# Patient Record
Sex: MALE | Race: WHITE | NOT HISPANIC OR LATINO | Employment: FULL TIME | URBAN - METROPOLITAN AREA
[De-identification: names, ages, dates, MRNs, and addresses within clinical notes are randomized per-mention and may not be internally consistent; named-entity substitution may affect disease eponyms.]

---

## 2023-02-09 PROBLEM — M54.2 NECK PAIN: Status: ACTIVE | Noted: 2023-02-09
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
## 2023-02-09 PROBLEM — D23.9 DYSPLASTIC NEVI: Status: ACTIVE | Noted: 2023-02-09

## 2023-02-09 PROBLEM — R53.83 FATIGUE: Status: ACTIVE | Noted: 2023-02-09

## 2023-02-09 PROBLEM — R41.840 DIFFICULTY CONCENTRATING: Status: ACTIVE | Noted: 2023-02-09

## 2023-02-09 RX ORDER — TIZANIDINE 4 MG/1
1 TABLET ORAL NIGHTLY
COMMUNITY
Start: 2022-10-25 | End: 2023-04-04 | Stop reason: SDUPTHER

## 2023-02-09 RX ORDER — TADALAFIL 10 MG/1
1 TABLET ORAL
COMMUNITY
Start: 2021-12-15 | End: 2023-04-04 | Stop reason: SDUPTHER

## 2023-02-09 RX ORDER — LISDEXAMFETAMINE DIMESYLATE 40 MG/1
1 CAPSULE ORAL DAILY
COMMUNITY
End: 2023-03-09 | Stop reason: SDUPTHER

## 2023-03-09 DIAGNOSIS — R41.840 DIFFICULTY CONCENTRATING: Primary | ICD-10-CM

## 2023-03-09 RX ORDER — LISDEXAMFETAMINE DIMESYLATE 40 MG/1
40 CAPSULE ORAL DAILY
Qty: 30 CAPSULE | Refills: 0 | Status: SHIPPED | OUTPATIENT
Start: 2023-03-09 | End: 2023-04-04 | Stop reason: SDUPTHER

## 2023-03-09 NOTE — TELEPHONE ENCOUNTER
Requested Prescriptions     Pending Prescriptions Disp Refills    lisdexamfetamine (Vyvanse) 40 mg capsule 30 capsule 0     Sig: Take 1 capsule (40 mg) by mouth once daily.

## 2023-04-04 ENCOUNTER — OFFICE VISIT (OUTPATIENT)
Dept: PRIMARY CARE | Facility: CLINIC | Age: 53
End: 2023-04-04
Payer: COMMERCIAL

## 2023-04-04 VITALS
WEIGHT: 205 LBS | TEMPERATURE: 97.2 F | DIASTOLIC BLOOD PRESSURE: 96 MMHG | HEART RATE: 88 BPM | RESPIRATION RATE: 18 BRPM | SYSTOLIC BLOOD PRESSURE: 140 MMHG

## 2023-04-04 DIAGNOSIS — F98.8 ATTENTION DEFICIT DISORDER, UNSPECIFIED HYPERACTIVITY PRESENCE: Primary | ICD-10-CM

## 2023-04-04 DIAGNOSIS — R41.840 DIFFICULTY CONCENTRATING: ICD-10-CM

## 2023-04-04 DIAGNOSIS — Z00.00 WELL ADULT EXAM: ICD-10-CM

## 2023-04-04 DIAGNOSIS — M54.50 CHRONIC BILATERAL LOW BACK PAIN WITHOUT SCIATICA: ICD-10-CM

## 2023-04-04 DIAGNOSIS — G89.29 CHRONIC BILATERAL LOW BACK PAIN WITHOUT SCIATICA: ICD-10-CM

## 2023-04-04 LAB
AMPHETAMINE (PRESENCE) IN URINE BY SCREEN METHOD: ABNORMAL
BARBITURATES PRESENCE IN URINE BY SCREEN METHOD: ABNORMAL
BENZODIAZEPINE (PRESENCE) IN URINE BY SCREEN METHOD: ABNORMAL
CANNABINOIDS IN URINE BY SCREEN METHOD: ABNORMAL
COCAINE (PRESENCE) IN URINE BY SCREEN METHOD: ABNORMAL
DRUG SCREEN COMMENT URINE: ABNORMAL
FENTANYL URINE: ABNORMAL
METHADONE (PRESENCE) IN URINE BY SCREEN METHOD: ABNORMAL
OPIATES (PRESENCE) IN URINE BY SCREEN METHOD: ABNORMAL
OXYCODONE (PRESENCE) IN URINE BY SCREEN METHOD: ABNORMAL
PHENCYCLIDINE (PRESENCE) IN URINE BY SCREEN METHOD: ABNORMAL

## 2023-04-04 PROCEDURE — 80307 DRUG TEST PRSMV CHEM ANLYZR: CPT

## 2023-04-04 PROCEDURE — 36415 COLL VENOUS BLD VENIPUNCTURE: CPT

## 2023-04-04 PROCEDURE — 80324 DRUG SCREEN AMPHETAMINES 1/2: CPT

## 2023-04-04 PROCEDURE — 99213 OFFICE O/P EST LOW 20 MIN: CPT | Performed by: FAMILY MEDICINE

## 2023-04-04 RX ORDER — TADALAFIL 10 MG/1
10 TABLET ORAL DAILY
Qty: 10 TABLET | Refills: 1 | Status: SHIPPED | OUTPATIENT
Start: 2023-04-04 | End: 2023-06-19 | Stop reason: SDUPTHER

## 2023-04-04 RX ORDER — LISDEXAMFETAMINE DIMESYLATE 40 MG/1
40 CAPSULE ORAL DAILY
Qty: 30 CAPSULE | Refills: 0 | Status: SHIPPED | OUTPATIENT
Start: 2023-04-04 | End: 2023-05-09 | Stop reason: SDUPTHER

## 2023-04-04 RX ORDER — TIZANIDINE 4 MG/1
4 TABLET ORAL NIGHTLY
Qty: 90 TABLET | Refills: 1 | Status: SHIPPED | OUTPATIENT
Start: 2023-04-04 | End: 2023-06-19 | Stop reason: ALTCHOICE

## 2023-04-04 ASSESSMENT — ENCOUNTER SYMPTOMS
CONSTITUTIONAL NEGATIVE: 1
SHORTNESS OF BREATH: 0
NERVOUS/ANXIOUS: 0
HYPERACTIVE: 0
DECREASED CONCENTRATION: 1
FATIGUE: 0
CHEST TIGHTNESS: 0
APPETITE CHANGE: 0
AGITATION: 0
HEADACHES: 0
TREMORS: 0

## 2023-04-04 NOTE — PROGRESS NOTES
OARRS:  No data recorded  I have personally reviewed the OARRS report for Josue Quintanilla. I have considered the risks of abuse, dependence, addiction and diversion    Is the patient prescribed a combination of a benzodiazepine and opioid?  No    Last Urine Drug Screen / ordered today: No  Recent Results (from the past 68634 hour(s))   Amphetamine Confirm, Urine    Collection Time: 03/18/22  7:34 AM   Result Value Ref Range    Amphetamines,Urine 1,197 ng/mL    MDA, Urine <200 ng/mL    MDEA, Urine <200 ng/mL    MDMA, Urine <200 ng/mL    Methamphetamine Quant, Ur <200 ng/mL    Phentermine,Urine <200 ng/mL   Drug Screen, Urine With Reflex to Confirmation    Collection Time: 03/18/22  7:34 AM   Result Value Ref Range    DRUG SCREEN COMMENT URINE SEE BELOW     Amphetamine Screen, Urine PRESUMPTIVE POSITIVE (A) NEGATIVE    Barbiturate Screen, Urine PRESUMPTIVE NEGATIVE NEGATIVE    BENZODIAZEPINE (PRESENCE) IN URINE BY SCREEN METHOD PRESUMPTIVE NEGATIVE NEGATIVE    Cannabinoid Screen, Urine PRESUMPTIVE NEGATIVE NEGATIVE    Cocaine Screen, Urine PRESUMPTIVE NEGATIVE NEGATIVE    Fentanyl, Ur PRESUMPTIVE NEGATIVE NEGATIVE    Methadone Screen, Urine PRESUMPTIVE NEGATIVE NEGATIVE    Opiate Screen, Urine PRESUMPTIVE NEGATIVE NEGATIVE    Oxycodone Screen, Ur PRESUMPTIVE NEGATIVE NEGATIVE    PCP Screen, Urine PRESUMPTIVE NEGATIVE NEGATIVE     Results are as expected.     Controlled Substance Agreement:  Date of the Last Agreement: 4/4/23  Reviewed Controlled Substance Agreement including but not limited to the benefits, risks, and alternatives to treatment with a Controlled Substance medication(s).    Stimulants:   What is the patient's goal of therapy? HELP WITH CONCENTRATION  Is this being achieved with current treatment? YES    Activities of Daily Living:   Is your overall impression that this patient is benefiting (symptom reduction outweighs side effects) from stimulant therapy? Yes     1. Physical Functioning: Better  2. Family  Relationship: Better  3. Social Relationship: Better  4. Mood: Better  5. Sleep Patterns: Better  6. Overall Function: Better  Subjective   Patient ID: Josue Quintanilla is a 52 y.o. male who presents for Med Refill (3 month med check.  ).  HPI    Review of Systems   Constitutional: Negative.  Negative for appetite change and fatigue.   Respiratory:  Negative for chest tightness and shortness of breath.    Neurological:  Negative for tremors and headaches.   Psychiatric/Behavioral:  Positive for decreased concentration. Negative for agitation. The patient is not nervous/anxious and is not hyperactive.         No Insomnia       Objective   Physical Exam  Constitutional:       Appearance: Normal appearance.   HENT:      Head: Normocephalic and atraumatic.   Cardiovascular:      Rate and Rhythm: Normal rate and regular rhythm.   Pulmonary:      Effort: Pulmonary effort is normal.   Skin:     General: Skin is warm and dry.   Neurological:      Mental Status: He is alert and oriented to person, place, and time.   Psychiatric:         Attention and Perception: He is inattentive.         Mood and Affect: Mood normal.         Speech: Speech normal.         Behavior: Behavior normal. Behavior is cooperative.         Cognition and Memory: Cognition normal.         Assessment/Plan   Problem List Items Addressed This Visit          Other    Difficulty concentrating    Relevant Medications    lisdexamfetamine (Vyvanse) 40 mg capsule     Other Visit Diagnoses       Attention deficit disorder, unspecified hyperactivity presence    -  Primary    Relevant Orders    Drug Screen, Urine With Reflex to Confirmation    Amphetamine Confirm, Urine    Chronic bilateral low back pain without sciatica        Relevant Medications    tiZANidine (Zanaflex) 4 mg tablet    Well adult exam        Relevant Medications    tadalafil (Cialis) 10 mg tablet

## 2023-04-08 LAB
AMPHETAMINES,URINE: 3878 NG/ML
MDA,URINE: <200 NG/ML
MDEA,URINE: <200 NG/ML
MDMA,UR: <200 NG/ML
METHAMPHETAMINE QUANTITATIVE URINE: <200 NG/ML
PHENTERMINE,UR: <200 NG/ML

## 2023-05-09 ENCOUNTER — TELEPHONE (OUTPATIENT)
Dept: PEDIATRICS | Facility: CLINIC | Age: 53
End: 2023-05-09
Payer: COMMERCIAL

## 2023-05-09 DIAGNOSIS — R41.840 DIFFICULTY CONCENTRATING: ICD-10-CM

## 2023-05-09 RX ORDER — LISDEXAMFETAMINE DIMESYLATE 40 MG/1
40 CAPSULE ORAL DAILY
Qty: 30 CAPSULE | Refills: 0 | Status: SHIPPED | OUTPATIENT
Start: 2023-05-09 | End: 2023-06-07 | Stop reason: SDUPTHER

## 2023-06-06 ENCOUNTER — TELEPHONE (OUTPATIENT)
Dept: PRIMARY CARE | Facility: CLINIC | Age: 53
End: 2023-06-06
Payer: COMMERCIAL

## 2023-06-07 DIAGNOSIS — R41.840 DIFFICULTY CONCENTRATING: ICD-10-CM

## 2023-06-07 RX ORDER — LISDEXAMFETAMINE DIMESYLATE 40 MG/1
40 CAPSULE ORAL DAILY
Qty: 30 CAPSULE | Refills: 0 | Status: SHIPPED | OUTPATIENT
Start: 2023-06-07 | End: 2023-06-26 | Stop reason: SDUPTHER

## 2023-06-19 ENCOUNTER — OFFICE VISIT (OUTPATIENT)
Dept: PRIMARY CARE | Facility: CLINIC | Age: 53
End: 2023-06-19
Payer: COMMERCIAL

## 2023-06-19 VITALS
DIASTOLIC BLOOD PRESSURE: 92 MMHG | SYSTOLIC BLOOD PRESSURE: 136 MMHG | RESPIRATION RATE: 17 BRPM | HEIGHT: 72 IN | BODY MASS INDEX: 27.22 KG/M2 | TEMPERATURE: 97.4 F | HEART RATE: 92 BPM | WEIGHT: 201 LBS

## 2023-06-19 DIAGNOSIS — R41.840 DIFFICULTY CONCENTRATING: Primary | ICD-10-CM

## 2023-06-19 DIAGNOSIS — Z00.00 WELL ADULT EXAM: ICD-10-CM

## 2023-06-19 PROCEDURE — 99213 OFFICE O/P EST LOW 20 MIN: CPT | Performed by: FAMILY MEDICINE

## 2023-06-19 RX ORDER — TADALAFIL 10 MG/1
10 TABLET ORAL DAILY
Qty: 10 TABLET | Refills: 1 | Status: SHIPPED | OUTPATIENT
Start: 2023-06-19 | End: 2023-06-19 | Stop reason: SDUPTHER

## 2023-06-19 RX ORDER — TADALAFIL 10 MG/1
10 TABLET ORAL DAILY
Qty: 10 TABLET | Refills: 3 | Status: SHIPPED | OUTPATIENT
Start: 2023-06-19 | End: 2024-01-24 | Stop reason: SDUPTHER

## 2023-06-19 ASSESSMENT — ENCOUNTER SYMPTOMS
HYPERACTIVE: 0
NERVOUS/ANXIOUS: 0
DECREASED CONCENTRATION: 1
FATIGUE: 0
HEADACHES: 0
APPETITE CHANGE: 0
CONSTITUTIONAL NEGATIVE: 1
SHORTNESS OF BREATH: 0
TREMORS: 0
CHEST TIGHTNESS: 0
AGITATION: 0

## 2023-06-19 ASSESSMENT — PATIENT HEALTH QUESTIONNAIRE - PHQ9
1. LITTLE INTEREST OR PLEASURE IN DOING THINGS: NOT AT ALL
2. FEELING DOWN, DEPRESSED OR HOPELESS: NOT AT ALL
SUM OF ALL RESPONSES TO PHQ9 QUESTIONS 1 AND 2: 0

## 2023-06-19 NOTE — PROGRESS NOTES
OARRS:  No data recorded  I have personally reviewed the OARRS report for Josue Quintanilla. I have considered the risks of abuse, dependence, addiction and diversion    Is the patient prescribed a combination of a benzodiazepine and opioid?  No    Last Urine Drug Screen / ordered today: No  Recent Results (from the past 72735 hour(s))   Amphetamine Confirm, Urine    Collection Time: 04/04/23  2:24 PM   Result Value Ref Range    Methamphetamine Quant, Ur <200 ng/mL    MDA, Urine <200 ng/mL    MDEA, Urine <200 ng/mL    Phentermine,Urine <200 ng/mL    Amphetamines,Urine 3878 ng/mL    MDMA, Urine <200 ng/mL   Drug Screen, Urine With Reflex to Confirmation    Collection Time: 04/04/23  2:24 PM   Result Value Ref Range    DRUG SCREEN COMMENT URINE SEE BELOW     Amphetamine Screen, Urine PRESUMPTIVE POSITIVE (A) NEGATIVE    Barbiturate Screen, Urine PRESUMPTIVE NEGATIVE NEGATIVE    BENZODIAZEPINE (PRESENCE) IN URINE BY SCREEN METHOD PRESUMPTIVE NEGATIVE NEGATIVE    Cannabinoid Screen, Urine PRESUMPTIVE NEGATIVE NEGATIVE    Cocaine Screen, Urine PRESUMPTIVE NEGATIVE NEGATIVE    Fentanyl, Ur PRESUMPTIVE NEGATIVE NEGATIVE    Methadone Screen, Urine PRESUMPTIVE NEGATIVE NEGATIVE    Opiate Screen, Urine PRESUMPTIVE NEGATIVE NEGATIVE    Oxycodone Screen, Ur PRESUMPTIVE NEGATIVE NEGATIVE    PCP Screen, Urine PRESUMPTIVE NEGATIVE NEGATIVE     Results are as expected.     Controlled Substance Agreement:  Date of the Last Agreement: 4/4/23  Reviewed Controlled Substance Agreement including but not limited to the benefits, risks, and alternatives to treatment with a Controlled Substance medication(s).    Stimulants:   What is the patient's goal of therapy? Help with concedntration  Is this being achieved with current treatment? yes    Activities of Daily Living:   Is your overall impression that this patient is benefiting (symptom reduction outweighs side effects) from stimulant therapy? Yes     1. Physical Functioning: Better  2. Family  Relationship: Better  3. Social Relationship: Better  4. Mood: Better  5. Sleep Patterns: Better  6. Overall Function: Better  Subjective   Patient ID: Josue Quintanilla is a 53 y.o. male who presents for Med Refill (3 month med check. Pt states he has been doing well and has no new concerns today. ).  HPI    Review of Systems   Constitutional: Negative.  Negative for appetite change and fatigue.   Respiratory:  Negative for chest tightness and shortness of breath.    Neurological:  Negative for tremors and headaches.   Psychiatric/Behavioral:  Positive for decreased concentration. Negative for agitation. The patient is not nervous/anxious and is not hyperactive.         No Insomnia       Objective   Physical Exam  Constitutional:       Appearance: Normal appearance.   HENT:      Head: Normocephalic and atraumatic.   Cardiovascular:      Rate and Rhythm: Normal rate and regular rhythm.   Pulmonary:      Effort: Pulmonary effort is normal.   Skin:     General: Skin is warm and dry.   Neurological:      Mental Status: He is alert and oriented to person, place, and time.   Psychiatric:         Attention and Perception: He is inattentive.         Mood and Affect: Mood normal.         Speech: Speech normal.         Behavior: Behavior normal. Behavior is cooperative.         Cognition and Memory: Cognition normal.         Assessment/Plan   Problem List Items Addressed This Visit       Difficulty concentrating - Primary     Other Visit Diagnoses       Well adult exam        Relevant Medications    tadalafil (Cialis) 10 mg tablet

## 2023-06-26 DIAGNOSIS — R41.840 DIFFICULTY CONCENTRATING: ICD-10-CM

## 2023-06-26 RX ORDER — LISDEXAMFETAMINE DIMESYLATE 40 MG/1
40 CAPSULE ORAL DAILY
Qty: 30 CAPSULE | Refills: 0 | Status: SHIPPED | OUTPATIENT
Start: 2023-06-26 | End: 2023-08-02 | Stop reason: SDUPTHER

## 2023-07-03 ENCOUNTER — TELEPHONE (OUTPATIENT)
Dept: PEDIATRICS | Facility: CLINIC | Age: 53
End: 2023-07-03
Payer: COMMERCIAL

## 2023-08-02 DIAGNOSIS — R41.840 DIFFICULTY CONCENTRATING: ICD-10-CM

## 2023-08-02 RX ORDER — LISDEXAMFETAMINE DIMESYLATE 40 MG/1
40 CAPSULE ORAL DAILY
Qty: 30 CAPSULE | Refills: 0 | Status: SHIPPED | OUTPATIENT
Start: 2023-08-02 | End: 2023-08-29 | Stop reason: SDUPTHER

## 2023-08-29 ENCOUNTER — TELEPHONE (OUTPATIENT)
Dept: PRIMARY CARE | Facility: CLINIC | Age: 53
End: 2023-08-29
Payer: COMMERCIAL

## 2023-08-29 DIAGNOSIS — R41.840 DIFFICULTY CONCENTRATING: ICD-10-CM

## 2023-08-29 RX ORDER — LISDEXAMFETAMINE DIMESYLATE 40 MG/1
40 CAPSULE ORAL DAILY
Qty: 30 CAPSULE | Refills: 0 | Status: SHIPPED | OUTPATIENT
Start: 2023-08-29 | End: 2023-10-06 | Stop reason: SDUPTHER

## 2023-08-29 NOTE — TELEPHONE ENCOUNTER
Patient requests prescription below    Last Office Visit: 6/19/2023     Requested Prescriptions     Pending Prescriptions Disp Refills    lisdexamfetamine (Vyvanse) 40 mg capsule 30 capsule 0     Sig: Take 1 capsule (40 mg) by mouth once daily.

## 2023-10-06 DIAGNOSIS — R41.840 DIFFICULTY CONCENTRATING: ICD-10-CM

## 2023-10-06 RX ORDER — LISDEXAMFETAMINE DIMESYLATE 40 MG/1
40 CAPSULE ORAL DAILY
Qty: 30 CAPSULE | Refills: 0 | Status: SHIPPED | OUTPATIENT
Start: 2023-10-06 | End: 2023-11-01 | Stop reason: SDUPTHER

## 2023-10-06 NOTE — TELEPHONE ENCOUNTER
Patient requests prescription below    Last Office Visit: 9/13/2023     Requested Prescriptions     Pending Prescriptions Disp Refills    lisdexamfetamine (Vyvanse) 40 mg capsule 30 capsule 0     Sig: Take 1 capsule (40 mg) by mouth once daily.

## 2023-11-01 DIAGNOSIS — R41.840 DIFFICULTY CONCENTRATING: ICD-10-CM

## 2023-11-01 RX ORDER — LISDEXAMFETAMINE DIMESYLATE 40 MG/1
40 CAPSULE ORAL DAILY
Qty: 90 CAPSULE | Refills: 0 | Status: SHIPPED | OUTPATIENT
Start: 2023-11-01 | End: 2024-01-24 | Stop reason: SDUPTHER

## 2023-11-01 NOTE — TELEPHONE ENCOUNTER
Patient requests prescription below    Last Office Visit: 9/13/2023     Requested Prescriptions     Pending Prescriptions Disp Refills    lisdexamfetamine (Vyvanse) 40 mg capsule 90 capsule 0     Sig: Take 1 capsule (40 mg) by mouth once daily.

## 2023-11-10 ENCOUNTER — LAB (OUTPATIENT)
Dept: LAB | Facility: LAB | Age: 53
End: 2023-11-10
Payer: COMMERCIAL

## 2023-11-10 DIAGNOSIS — L03.90 CELLULITIS, UNSPECIFIED CELLULITIS SITE: Primary | ICD-10-CM

## 2023-11-10 DIAGNOSIS — L03.90 CELLULITIS, UNSPECIFIED CELLULITIS SITE: ICD-10-CM

## 2023-11-10 LAB
ALBUMIN SERPL BCP-MCNC: 4.5 G/DL (ref 3.4–5)
ALP SERPL-CCNC: 65 U/L (ref 33–120)
ALT SERPL W P-5'-P-CCNC: 17 U/L (ref 10–52)
ANION GAP SERPL CALC-SCNC: 12 MMOL/L (ref 10–20)
AST SERPL W P-5'-P-CCNC: 20 U/L (ref 9–39)
BASOPHILS # BLD AUTO: 0.04 X10*3/UL (ref 0–0.1)
BASOPHILS NFR BLD AUTO: 0.6 %
BILIRUB SERPL-MCNC: 0.8 MG/DL (ref 0–1.2)
BUN SERPL-MCNC: 13 MG/DL (ref 6–23)
CALCIUM SERPL-MCNC: 9.6 MG/DL (ref 8.6–10.3)
CHLORIDE SERPL-SCNC: 100 MMOL/L (ref 98–107)
CO2 SERPL-SCNC: 30 MMOL/L (ref 21–32)
CREAT SERPL-MCNC: 1.04 MG/DL (ref 0.5–1.3)
EOSINOPHIL # BLD AUTO: 0.03 X10*3/UL (ref 0–0.7)
EOSINOPHIL NFR BLD AUTO: 0.4 %
ERYTHROCYTE [DISTWIDTH] IN BLOOD BY AUTOMATED COUNT: 12.3 % (ref 11.5–14.5)
GFR SERPL CREATININE-BSD FRML MDRD: 87 ML/MIN/1.73M*2
GLUCOSE SERPL-MCNC: 95 MG/DL (ref 74–99)
HCT VFR BLD AUTO: 40 % (ref 41–52)
HGB BLD-MCNC: 13.8 G/DL (ref 13.5–17.5)
IMM GRANULOCYTES # BLD AUTO: 0.05 X10*3/UL (ref 0–0.7)
IMM GRANULOCYTES NFR BLD AUTO: 0.7 % (ref 0–0.9)
LYMPHOCYTES # BLD AUTO: 2.08 X10*3/UL (ref 1.2–4.8)
LYMPHOCYTES NFR BLD AUTO: 29.3 %
MCH RBC QN AUTO: 29.1 PG (ref 26–34)
MCHC RBC AUTO-ENTMCNC: 34.5 G/DL (ref 32–36)
MCV RBC AUTO: 84 FL (ref 80–100)
MONOCYTES # BLD AUTO: 0.42 X10*3/UL (ref 0.1–1)
MONOCYTES NFR BLD AUTO: 5.9 %
NEUTROPHILS # BLD AUTO: 4.47 X10*3/UL (ref 1.2–7.7)
NEUTROPHILS NFR BLD AUTO: 63.1 %
NRBC BLD-RTO: 0 /100 WBCS (ref 0–0)
PLATELET # BLD AUTO: 251 X10*3/UL (ref 150–450)
POTASSIUM SERPL-SCNC: 4.4 MMOL/L (ref 3.5–5.3)
PROT SERPL-MCNC: 7.5 G/DL (ref 6.4–8.2)
RBC # BLD AUTO: 4.75 X10*6/UL (ref 4.5–5.9)
SODIUM SERPL-SCNC: 138 MMOL/L (ref 136–145)
WBC # BLD AUTO: 7.1 X10*3/UL (ref 4.4–11.3)

## 2023-11-10 PROCEDURE — 86618 LYME DISEASE ANTIBODY: CPT

## 2023-11-10 PROCEDURE — 36415 COLL VENOUS BLD VENIPUNCTURE: CPT

## 2023-11-10 PROCEDURE — 85025 COMPLETE CBC W/AUTO DIFF WBC: CPT

## 2023-11-10 PROCEDURE — 80053 COMPREHEN METABOLIC PANEL: CPT

## 2023-11-10 RX ORDER — DOXYCYCLINE 100 MG/1
100 CAPSULE ORAL 2 TIMES DAILY
Qty: 42 CAPSULE | Refills: 0 | Status: SHIPPED | OUTPATIENT
Start: 2023-11-10 | End: 2023-12-01

## 2023-11-10 RX ORDER — DOXYCYCLINE 100 MG/1
100 CAPSULE ORAL 2 TIMES DAILY
Qty: 42 CAPSULE | Refills: 0 | Status: SHIPPED | OUTPATIENT
Start: 2023-11-10 | End: 2023-11-10 | Stop reason: SDUPTHER

## 2023-11-12 LAB — B BURGDOR.VLSE1+PEPC10 AB SER IA-ACNC: 0.65 IV

## 2024-01-24 ENCOUNTER — OFFICE VISIT (OUTPATIENT)
Dept: PRIMARY CARE | Facility: CLINIC | Age: 54
End: 2024-01-24
Payer: COMMERCIAL

## 2024-01-24 VITALS
SYSTOLIC BLOOD PRESSURE: 140 MMHG | DIASTOLIC BLOOD PRESSURE: 90 MMHG | HEART RATE: 82 BPM | TEMPERATURE: 97.4 F | WEIGHT: 209 LBS | BODY MASS INDEX: 28.35 KG/M2 | RESPIRATION RATE: 17 BRPM

## 2024-01-24 DIAGNOSIS — R41.840 DIFFICULTY CONCENTRATING: ICD-10-CM

## 2024-01-24 DIAGNOSIS — J06.9 UPPER RESPIRATORY TRACT INFECTION, UNSPECIFIED TYPE: Primary | ICD-10-CM

## 2024-01-24 DIAGNOSIS — Z00.00 WELL ADULT EXAM: ICD-10-CM

## 2024-01-24 PROCEDURE — 99213 OFFICE O/P EST LOW 20 MIN: CPT | Performed by: FAMILY MEDICINE

## 2024-01-24 RX ORDER — TADALAFIL 10 MG/1
10 TABLET ORAL DAILY
Qty: 10 TABLET | Refills: 3 | Status: SHIPPED | OUTPATIENT
Start: 2024-01-24 | End: 2024-05-08

## 2024-01-24 RX ORDER — AZITHROMYCIN 250 MG/1
TABLET, FILM COATED ORAL
Qty: 6 TABLET | Refills: 0 | Status: SHIPPED | OUTPATIENT
Start: 2024-01-24 | End: 2024-01-29

## 2024-01-24 RX ORDER — LISDEXAMFETAMINE DIMESYLATE 40 MG/1
40 CAPSULE ORAL DAILY
Qty: 90 CAPSULE | Refills: 0 | Status: SHIPPED | OUTPATIENT
Start: 2024-01-24 | End: 2024-04-22 | Stop reason: SDUPTHER

## 2024-01-24 RX ORDER — FLUTICASONE PROPIONATE 50 MCG
1 SPRAY, SUSPENSION (ML) NASAL DAILY
Qty: 16 G | Refills: 11 | Status: SHIPPED | OUTPATIENT
Start: 2024-01-24 | End: 2024-01-30

## 2024-01-24 ASSESSMENT — ENCOUNTER SYMPTOMS
SORE THROAT: 0
COUGH: 1

## 2024-01-24 NOTE — PROGRESS NOTES
Subjective   Patient ID: Josue Quintanilla is a 53 y.o. male who presents for Cough.  Cough  This is a recurrent problem. The current episode started 1 to 4 weeks ago. The problem has been gradually worsening. The problem occurs constantly. The cough is Non-productive. Associated symptoms include nasal congestion and rhinorrhea. Pertinent negatives include no chest pain, fever, rash, sore throat or shortness of breath. The symptoms are aggravated by lying down. Treatments tried: castro SLOAN The treatment provided moderate relief.       Review of Systems   Constitutional:  Negative for fever.   HENT:  Positive for congestion, rhinorrhea and sinus pain. Negative for sore throat.    Respiratory:  Positive for cough. Negative for shortness of breath.    Cardiovascular:  Negative for chest pain.   Gastrointestinal:  Negative for abdominal pain.   Skin:  Negative for rash.       Objective   Physical Exam  Constitutional:       Appearance: Normal appearance.   HENT:      Head: Normocephalic.      Nose: Congestion present.   Pulmonary:      Effort: Pulmonary effort is normal.   Musculoskeletal:      Cervical back: Neck supple.   Skin:     General: Skin is warm and dry.   Psychiatric:         Mood and Affect: Mood normal.         Assessment/Plan   Problem List Items Addressed This Visit             ICD-10-CM    Difficulty concentrating R41.840    Relevant Medications    lisdexamfetamine (Vyvanse) 40 mg capsule     Other Visit Diagnoses         Codes    Upper respiratory tract infection, unspecified type    -  Primary J06.9    Relevant Medications    azithromycin (Zithromax) 250 mg tablet    fluticasone (Flonase) 50 mcg/actuation nasal spray    Well adult exam     Z00.00    Relevant Medications    tadalafil (Cialis) 10 mg tablet                 Rashida Hancock CMA 01/24/24 3:23 PM

## 2024-01-25 ASSESSMENT — ENCOUNTER SYMPTOMS
SHORTNESS OF BREATH: 0
SINUS PAIN: 1
RHINORRHEA: 1
ABDOMINAL PAIN: 0
FEVER: 0

## 2024-03-11 ENCOUNTER — OFFICE VISIT (OUTPATIENT)
Dept: PRIMARY CARE | Facility: CLINIC | Age: 54
End: 2024-03-11
Payer: COMMERCIAL

## 2024-03-11 VITALS
DIASTOLIC BLOOD PRESSURE: 90 MMHG | WEIGHT: 206 LBS | HEART RATE: 99 BPM | SYSTOLIC BLOOD PRESSURE: 136 MMHG | TEMPERATURE: 97.9 F | RESPIRATION RATE: 17 BRPM | BODY MASS INDEX: 27.94 KG/M2

## 2024-03-11 DIAGNOSIS — J06.9 UPPER RESPIRATORY TRACT INFECTION, UNSPECIFIED TYPE: Primary | ICD-10-CM

## 2024-03-11 DIAGNOSIS — R11.0 NAUSEA: ICD-10-CM

## 2024-03-11 DIAGNOSIS — R05.9 COUGH, UNSPECIFIED TYPE: ICD-10-CM

## 2024-03-11 LAB
POC RAPID INFLUENZA A: NEGATIVE
POC RAPID INFLUENZA B: NEGATIVE
POC SARS-COV-2 AG BINAX: NORMAL

## 2024-03-11 PROCEDURE — 87811 SARS-COV-2 COVID19 W/OPTIC: CPT | Performed by: FAMILY MEDICINE

## 2024-03-11 PROCEDURE — 99213 OFFICE O/P EST LOW 20 MIN: CPT | Performed by: FAMILY MEDICINE

## 2024-03-11 PROCEDURE — 87804 INFLUENZA ASSAY W/OPTIC: CPT | Performed by: FAMILY MEDICINE

## 2024-03-11 RX ORDER — ONDANSETRON 8 MG/1
8 TABLET, ORALLY DISINTEGRATING ORAL EVERY 8 HOURS PRN
Qty: 20 TABLET | Refills: 0 | Status: SHIPPED | OUTPATIENT
Start: 2024-03-11 | End: 2024-03-18

## 2024-03-11 RX ORDER — IBUPROFEN 800 MG/1
800 TABLET ORAL 3 TIMES DAILY PRN
Qty: 60 TABLET | Refills: 0 | Status: SHIPPED | OUTPATIENT
Start: 2024-03-11 | End: 2024-05-10

## 2024-03-11 ASSESSMENT — ENCOUNTER SYMPTOMS
HEADACHES: 1
SORE THROAT: 0
CHILLS: 1
COUGH: 1

## 2024-03-11 NOTE — PROGRESS NOTES
Subjective   Patient ID: Josue Quintanilla is a 53 y.o. male who presents for Cough.    Cough  This is a new problem. The current episode started in the past 7 days. The problem has been unchanged. The problem occurs constantly. The cough is Non-productive. Associated symptoms include chills and headaches. Pertinent negatives include no sore throat. Nothing aggravates the symptoms. Treatments tried: Nyquil and dayquil.        Review of Systems   Constitutional:  Positive for chills.   HENT:  Negative for sore throat.    Respiratory:  Positive for cough.    Neurological:  Positive for headaches.       Objective   Temp 36.6 °C (97.9 °F)   Resp 17   Wt 93.4 kg (206 lb)   BMI 27.94 kg/m²     Physical Exam  Constitutional:       Appearance: Normal appearance.   HENT:      Head: Normocephalic.   Pulmonary:      Effort: Pulmonary effort is normal.   Musculoskeletal:      Cervical back: Neck supple.   Skin:     General: Skin is warm and dry.   Psychiatric:         Mood and Affect: Mood normal.         Assessment/Plan   Problem List Items Addressed This Visit    None  Visit Diagnoses         Codes    Upper respiratory tract infection, unspecified type    -  Primary J06.9    Relevant Medications    ibuprofen 800 mg tablet    Cough, unspecified type     R05.9    Relevant Orders    POCT Influenza A/B manually resulted (Completed)    POCT BinaxNOW Covid-19 Ag Card manually resulted (Completed)    Nausea     R11.0    Relevant Medications    ibuprofen 800 mg tablet    ondansetron ODT (Zofran-ODT) 8 mg disintegrating tablet

## 2024-03-12 ENCOUNTER — HOSPITAL ENCOUNTER (INPATIENT)
Facility: HOSPITAL | Age: 54
LOS: 3 days | Discharge: HOME | DRG: 975 | End: 2024-03-15
Attending: EMERGENCY MEDICINE | Admitting: STUDENT IN AN ORGANIZED HEALTH CARE EDUCATION/TRAINING PROGRAM
Payer: COMMERCIAL

## 2024-03-12 ENCOUNTER — APPOINTMENT (OUTPATIENT)
Dept: RADIOLOGY | Facility: HOSPITAL | Age: 54
DRG: 975 | End: 2024-03-12
Payer: COMMERCIAL

## 2024-03-12 DIAGNOSIS — E87.1 HYPONATREMIA: ICD-10-CM

## 2024-03-12 DIAGNOSIS — D72.819 LEUKOPENIA, UNSPECIFIED TYPE: ICD-10-CM

## 2024-03-12 DIAGNOSIS — R51.9 HEADACHE: Primary | ICD-10-CM

## 2024-03-12 DIAGNOSIS — B20 SYMPTOMATIC HIV INFECTION (MULTI): ICD-10-CM

## 2024-03-12 LAB
ALBUMIN SERPL BCP-MCNC: 4.1 G/DL (ref 3.4–5)
ALP SERPL-CCNC: 38 U/L (ref 33–120)
ALT SERPL W P-5'-P-CCNC: 43 U/L (ref 10–52)
AMPHETAMINES UR QL SCN: ABNORMAL
ANION GAP SERPL CALC-SCNC: 8 MMOL/L (ref 10–20)
APPEARANCE CSF: CLEAR
APPEARANCE UR: CLEAR
AST SERPL W P-5'-P-CCNC: 74 U/L (ref 9–39)
BARBITURATES UR QL SCN: ABNORMAL
BASOPHILS NFR CSF MANUAL: 0 %
BENZODIAZ UR QL SCN: ABNORMAL
BILIRUB SERPL-MCNC: 0.5 MG/DL (ref 0–1.2)
BILIRUB UR STRIP.AUTO-MCNC: NEGATIVE MG/DL
BLASTS CSF MANUAL: 0 %
BUN SERPL-MCNC: 14 MG/DL (ref 6–23)
BZE UR QL SCN: ABNORMAL
CALCIUM SERPL-MCNC: 8.5 MG/DL (ref 8.6–10.3)
CANNABINOIDS UR QL SCN: ABNORMAL
CHLORIDE SERPL-SCNC: 90 MMOL/L (ref 98–107)
CHOLEST SERPL-MCNC: 112 MG/DL (ref 0–199)
CHOLESTEROL/HDL RATIO: 6.8
CO2 SERPL-SCNC: 31 MMOL/L (ref 21–32)
COLOR CSF: COLORLESS
COLOR SPUN CSF: COLORLESS
COLOR UR: YELLOW
CREAT SERPL-MCNC: 1.28 MG/DL (ref 0.5–1.3)
CREAT UR-MCNC: 221.6 MG/DL (ref 20–370)
CREAT UR-MCNC: 221.6 MG/DL (ref 20–370)
CRP SERPL-MCNC: <0.1 MG/DL
EGFRCR SERPLBLD CKD-EPI 2021: 68 ML/MIN/1.73M*2
EOSINOPHIL NFR CSF MANUAL: 0 %
ERYTHROCYTE [SEDIMENTATION RATE] IN BLOOD BY WESTERGREN METHOD: 6 MM/H (ref 0–20)
ETHANOL SERPL-MCNC: <10 MG/DL
FENTANYL+NORFENTANYL UR QL SCN: ABNORMAL
FLUAV RNA RESP QL NAA+PROBE: NOT DETECTED
FLUBV RNA RESP QL NAA+PROBE: NOT DETECTED
GLUCOSE CSF-MCNC: 69 MG/DL (ref 40–70)
GLUCOSE SERPL-MCNC: 100 MG/DL (ref 74–99)
GLUCOSE UR STRIP.AUTO-MCNC: NEGATIVE MG/DL
HDLC SERPL-MCNC: 16.4 MG/DL
HETEROPH AB SERPLBLD QL IA.RAPID: NEGATIVE
HGB RETIC QN: 32 PG (ref 28–38)
HOLD SPECIMEN: NORMAL
HOLD SPECIMEN: NORMAL
HSV1 DNA CSF QL NAA+PROBE: NOT DETECTED
HSV2 DNA CSF QL NAA+PROBE: NOT DETECTED
HYALINE CASTS #/AREA URNS AUTO: ABNORMAL /LPF
IMM GRANULOCYTES NFR CSF: 0 %
IMMATURE RETIC FRACTION: 1 %
KETONES UR STRIP.AUTO-MCNC: NEGATIVE MG/DL
LACTATE SERPL-SCNC: 0.8 MMOL/L (ref 0.4–2)
LDH CSF L TO P-CCNC: <25 U/L
LDH SERPL L TO P-CCNC: 276 U/L (ref 84–246)
LDLC SERPL CALC-MCNC: 61 MG/DL
LEUKOCYTE ESTERASE UR QL STRIP.AUTO: NEGATIVE
LYMPHOCYTES NFR CSF MANUAL: 100 % (ref 28–96)
METHADONE UR QL SCN: ABNORMAL
MONOS+MACROS NFR CSF MANUAL: 0 % (ref 16–56)
NEUTS SEG NFR CSF MANUAL: 0 % (ref 0–5)
NITRITE UR QL STRIP.AUTO: NEGATIVE
NON HDL CHOLESTEROL: 96 MG/DL (ref 0–149)
OPIATES UR QL SCN: ABNORMAL
OTHER CELLS NFR CSF MANUAL: 0 %
OXYCODONE+OXYMORPHONE UR QL SCN: ABNORMAL
PCP UR QL SCN: ABNORMAL
PH UR STRIP.AUTO: 5 [PH]
PLASMA CELLS NFR CSF MICRO: 0 %
POTASSIUM SERPL-SCNC: 3.8 MMOL/L (ref 3.5–5.3)
PROT CSF-MCNC: 55 MG/DL (ref 15–45)
PROT SERPL-MCNC: 6.6 G/DL (ref 6.4–8.2)
PROT UR STRIP.AUTO-MCNC: ABNORMAL MG/DL
PROT UR-ACNC: 190 MG/DL (ref 5–25)
PROT/CREAT UR: 0.86 MG/MG CREAT (ref 0–0.17)
RBC # CSF AUTO: <1000 /UL (ref 0–5)
RBC # UR STRIP.AUTO: ABNORMAL /UL
RBC #/AREA URNS AUTO: ABNORMAL /HPF
RETICS #: 0.02 X10*6/UL (ref 0.02–0.12)
RETICS/RBC NFR AUTO: 0.3 % (ref 0.5–2)
SARS-COV-2 RNA RESP QL NAA+PROBE: NOT DETECTED
SODIUM SERPL-SCNC: 125 MMOL/L (ref 136–145)
SODIUM UR-SCNC: 36 MMOL/L
SODIUM/CREAT UR-RTO: 16 MMOL/G CREAT
SP GR UR STRIP.AUTO: 1.02
TOTAL CELLS COUNTED CSF: 100
TRIGL SERPL-MCNC: 175 MG/DL (ref 0–149)
TSH SERPL-ACNC: 2.51 MIU/L (ref 0.44–3.98)
TUBE # CSF: ABNORMAL
UROBILINOGEN UR STRIP.AUTO-MCNC: <2 MG/DL
VLDL: 35 MG/DL (ref 0–40)
WBC # CSF AUTO: 1 /UL (ref 1–5)
WBC #/AREA URNS AUTO: ABNORMAL /HPF

## 2024-03-12 PROCEDURE — 84075 ASSAY ALKALINE PHOSPHATASE: CPT | Performed by: STUDENT IN AN ORGANIZED HEALTH CARE EDUCATION/TRAINING PROGRAM

## 2024-03-12 PROCEDURE — 71045 X-RAY EXAM CHEST 1 VIEW: CPT

## 2024-03-12 PROCEDURE — 87636 SARSCOV2 & INF A&B AMP PRB: CPT | Performed by: STUDENT IN AN ORGANIZED HEALTH CARE EDUCATION/TRAINING PROGRAM

## 2024-03-12 PROCEDURE — 87449 NOS EACH ORGANISM AG IA: CPT | Mod: STJLAB

## 2024-03-12 PROCEDURE — 36415 COLL VENOUS BLD VENIPUNCTURE: CPT | Performed by: STUDENT IN AN ORGANIZED HEALTH CARE EDUCATION/TRAINING PROGRAM

## 2024-03-12 PROCEDURE — 86255 FLUORESCENT ANTIBODY SCREEN: CPT

## 2024-03-12 PROCEDURE — 86160 COMPLEMENT ANTIGEN: CPT | Mod: STJLAB

## 2024-03-12 PROCEDURE — 2500000004 HC RX 250 GENERAL PHARMACY W/ HCPCS (ALT 636 FOR OP/ED)

## 2024-03-12 PROCEDURE — 84145 PROCALCITONIN (PCT): CPT | Mod: STJLAB

## 2024-03-12 PROCEDURE — 99285 EMERGENCY DEPT VISIT HI MDM: CPT | Performed by: EMERGENCY MEDICINE

## 2024-03-12 PROCEDURE — 82945 GLUCOSE OTHER FLUID: CPT | Performed by: EMERGENCY MEDICINE

## 2024-03-12 PROCEDURE — 86335 IMMUNFIX E-PHORSIS/URINE/CSF: CPT | Mod: STJLAB

## 2024-03-12 PROCEDURE — 84443 ASSAY THYROID STIM HORMONE: CPT

## 2024-03-12 PROCEDURE — 86703 HIV-1/HIV-2 1 RESULT ANTBDY: CPT

## 2024-03-12 PROCEDURE — 84156 ASSAY OF PROTEIN URINE: CPT

## 2024-03-12 PROCEDURE — 71045 X-RAY EXAM CHEST 1 VIEW: CPT | Performed by: STUDENT IN AN ORGANIZED HEALTH CARE EDUCATION/TRAINING PROGRAM

## 2024-03-12 PROCEDURE — 85049 AUTOMATED PLATELET COUNT: CPT

## 2024-03-12 PROCEDURE — 86334 IMMUNOFIX E-PHORESIS SERUM: CPT | Mod: STJLAB

## 2024-03-12 PROCEDURE — 84155 ASSAY OF PROTEIN SERUM: CPT | Mod: STJLAB

## 2024-03-12 PROCEDURE — 36415 COLL VENOUS BLD VENIPUNCTURE: CPT

## 2024-03-12 PROCEDURE — 84300 ASSAY OF URINE SODIUM: CPT

## 2024-03-12 PROCEDURE — 62270 DX LMBR SPI PNXR: CPT

## 2024-03-12 PROCEDURE — 85045 AUTOMATED RETICULOCYTE COUNT: CPT | Performed by: STUDENT IN AN ORGANIZED HEALTH CARE EDUCATION/TRAINING PROGRAM

## 2024-03-12 PROCEDURE — 62270 DX LMBR SPI PNXR: CPT | Performed by: EMERGENCY MEDICINE

## 2024-03-12 PROCEDURE — 85652 RBC SED RATE AUTOMATED: CPT | Performed by: STUDENT IN AN ORGANIZED HEALTH CARE EDUCATION/TRAINING PROGRAM

## 2024-03-12 PROCEDURE — 83615 LACTATE (LD) (LDH) ENZYME: CPT

## 2024-03-12 PROCEDURE — 81001 URINALYSIS AUTO W/SCOPE: CPT

## 2024-03-12 PROCEDURE — 2500000005 HC RX 250 GENERAL PHARMACY W/O HCPCS: Performed by: EMERGENCY MEDICINE

## 2024-03-12 PROCEDURE — 99285 EMERGENCY DEPT VISIT HI MDM: CPT | Mod: 25

## 2024-03-12 PROCEDURE — 96367 TX/PROPH/DG ADDL SEQ IV INF: CPT

## 2024-03-12 PROCEDURE — 86592 SYPHILIS TEST NON-TREP QUAL: CPT | Mod: STJLAB | Performed by: STUDENT IN AN ORGANIZED HEALTH CARE EDUCATION/TRAINING PROGRAM

## 2024-03-12 PROCEDURE — 87529 HSV DNA AMP PROBE: CPT | Mod: STJLAB | Performed by: EMERGENCY MEDICINE

## 2024-03-12 PROCEDURE — 83935 ASSAY OF URINE OSMOLALITY: CPT | Mod: STJLAB

## 2024-03-12 PROCEDURE — 70450 CT HEAD/BRAIN W/O DYE: CPT

## 2024-03-12 PROCEDURE — 009U3ZX DRAINAGE OF SPINAL CANAL, PERCUTANEOUS APPROACH, DIAGNOSTIC: ICD-10-PCS

## 2024-03-12 PROCEDURE — 80307 DRUG TEST PRSMV CHEM ANLYZR: CPT

## 2024-03-12 PROCEDURE — 83605 ASSAY OF LACTIC ACID: CPT

## 2024-03-12 PROCEDURE — 82077 ASSAY SPEC XCP UR&BREATH IA: CPT

## 2024-03-12 PROCEDURE — 84165 PROTEIN E-PHORESIS SERUM: CPT

## 2024-03-12 PROCEDURE — 86320 SERUM IMMUNOELECTROPHORESIS: CPT

## 2024-03-12 PROCEDURE — 82746 ASSAY OF FOLIC ACID SERUM: CPT | Mod: STJLAB

## 2024-03-12 PROCEDURE — 82607 VITAMIN B-12: CPT | Mod: STJLAB

## 2024-03-12 PROCEDURE — 87389 HIV-1 AG W/HIV-1&-2 AB AG IA: CPT | Mod: STJLAB

## 2024-03-12 PROCEDURE — 86140 C-REACTIVE PROTEIN: CPT | Performed by: STUDENT IN AN ORGANIZED HEALTH CARE EDUCATION/TRAINING PROGRAM

## 2024-03-12 PROCEDURE — 96375 TX/PRO/DX INJ NEW DRUG ADDON: CPT

## 2024-03-12 PROCEDURE — 83930 ASSAY OF BLOOD OSMOLALITY: CPT | Mod: STJLAB

## 2024-03-12 PROCEDURE — 96376 TX/PRO/DX INJ SAME DRUG ADON: CPT

## 2024-03-12 PROCEDURE — 85025 COMPLETE CBC W/AUTO DIFF WBC: CPT | Performed by: STUDENT IN AN ORGANIZED HEALTH CARE EDUCATION/TRAINING PROGRAM

## 2024-03-12 PROCEDURE — 1100000001 HC PRIVATE ROOM DAILY

## 2024-03-12 PROCEDURE — 87483 CNS DNA AMP PROBE TYPE 12-25: CPT | Mod: STJLAB | Performed by: EMERGENCY MEDICINE

## 2024-03-12 PROCEDURE — 87040 BLOOD CULTURE FOR BACTERIA: CPT | Mod: STJLAB

## 2024-03-12 PROCEDURE — 84157 ASSAY OF PROTEIN OTHER: CPT | Performed by: EMERGENCY MEDICINE

## 2024-03-12 PROCEDURE — 70450 CT HEAD/BRAIN W/O DYE: CPT | Performed by: RADIOLOGY

## 2024-03-12 PROCEDURE — 89051 BODY FLUID CELL COUNT: CPT | Performed by: EMERGENCY MEDICINE

## 2024-03-12 PROCEDURE — 87070 CULTURE OTHR SPECIMN AEROBIC: CPT | Mod: STJLAB | Performed by: EMERGENCY MEDICINE

## 2024-03-12 PROCEDURE — 84166 PROTEIN E-PHORESIS/URINE/CSF: CPT

## 2024-03-12 PROCEDURE — 83615 LACTATE (LD) (LDH) ENZYME: CPT | Mod: STJLAB | Performed by: EMERGENCY MEDICINE

## 2024-03-12 PROCEDURE — 96365 THER/PROPH/DIAG IV INF INIT: CPT

## 2024-03-12 PROCEDURE — 86325 OTHER IMMUNOELECTROPHORESIS: CPT

## 2024-03-12 PROCEDURE — 80061 LIPID PANEL: CPT

## 2024-03-12 PROCEDURE — 80074 ACUTE HEPATITIS PANEL: CPT | Mod: STJLAB

## 2024-03-12 PROCEDURE — 86403 PARTICLE AGGLUT ANTBDY SCRN: CPT | Mod: STJLAB | Performed by: EMERGENCY MEDICINE

## 2024-03-12 PROCEDURE — 86308 HETEROPHILE ANTIBODY SCREEN: CPT | Performed by: EMERGENCY MEDICINE

## 2024-03-12 PROCEDURE — 85610 PROTHROMBIN TIME: CPT

## 2024-03-12 RX ORDER — OXYCODONE HYDROCHLORIDE 5 MG/1
5 TABLET ORAL EVERY 6 HOURS PRN
Status: DISCONTINUED | OUTPATIENT
Start: 2024-03-12 | End: 2024-03-15 | Stop reason: HOSPADM

## 2024-03-12 RX ORDER — MORPHINE SULFATE 4 MG/ML
INJECTION, SOLUTION INTRAMUSCULAR; INTRAVENOUS
Status: COMPLETED
Start: 2024-03-12 | End: 2024-03-12

## 2024-03-12 RX ORDER — ACETAMINOPHEN 325 MG/1
650 TABLET ORAL EVERY 6 HOURS
Status: DISCONTINUED | OUTPATIENT
Start: 2024-03-13 | End: 2024-03-13

## 2024-03-12 RX ORDER — MORPHINE SULFATE 4 MG/ML
4 INJECTION, SOLUTION INTRAMUSCULAR; INTRAVENOUS ONCE
Status: COMPLETED | OUTPATIENT
Start: 2024-03-12 | End: 2024-03-12

## 2024-03-12 RX ORDER — ACETAMINOPHEN 325 MG/1
975 TABLET ORAL ONCE
Status: COMPLETED | OUTPATIENT
Start: 2024-03-12 | End: 2024-03-12

## 2024-03-12 RX ORDER — MEROPENEM 1 G/1
2 INJECTION, POWDER, FOR SOLUTION INTRAVENOUS ONCE
Status: COMPLETED | OUTPATIENT
Start: 2024-03-12 | End: 2024-03-12

## 2024-03-12 RX ORDER — VANCOMYCIN 2 GRAM/500 ML IN 0.9 % SODIUM CHLORIDE INTRAVENOUS
2 ONCE
Status: COMPLETED | OUTPATIENT
Start: 2024-03-12 | End: 2024-03-12

## 2024-03-12 RX ORDER — POLYETHYLENE GLYCOL 3350 17 G/17G
17 POWDER, FOR SOLUTION ORAL DAILY
Status: DISCONTINUED | OUTPATIENT
Start: 2024-03-13 | End: 2024-03-15 | Stop reason: HOSPADM

## 2024-03-12 RX ORDER — HYDROMORPHONE HYDROCHLORIDE 1 MG/ML
1 INJECTION, SOLUTION INTRAMUSCULAR; INTRAVENOUS; SUBCUTANEOUS ONCE
Status: COMPLETED | OUTPATIENT
Start: 2024-03-12 | End: 2024-03-12

## 2024-03-12 RX ORDER — ENOXAPARIN SODIUM 100 MG/ML
40 INJECTION SUBCUTANEOUS DAILY
Status: DISCONTINUED | OUTPATIENT
Start: 2024-03-12 | End: 2024-03-15 | Stop reason: HOSPADM

## 2024-03-12 RX ORDER — OXYCODONE HYDROCHLORIDE 10 MG/1
10 TABLET ORAL EVERY 6 HOURS PRN
Status: DISCONTINUED | OUTPATIENT
Start: 2024-03-12 | End: 2024-03-15 | Stop reason: HOSPADM

## 2024-03-12 RX ADMIN — MEROPENEM 2 G: 1 INJECTION, POWDER, FOR SOLUTION INTRAVENOUS at 19:22

## 2024-03-12 RX ADMIN — ACETAMINOPHEN 975 MG: 325 TABLET ORAL at 20:14

## 2024-03-12 RX ADMIN — MORPHINE SULFATE 4 MG: 4 INJECTION, SOLUTION INTRAMUSCULAR; INTRAVENOUS at 16:37

## 2024-03-12 RX ADMIN — SODIUM CHLORIDE 1000 ML: 9 INJECTION, SOLUTION INTRAVENOUS at 18:05

## 2024-03-12 RX ADMIN — HYDROMORPHONE HYDROCHLORIDE 0.5 MG: 1 INJECTION, SOLUTION INTRAMUSCULAR; INTRAVENOUS; SUBCUTANEOUS at 18:05

## 2024-03-12 RX ADMIN — ACYCLOVIR SODIUM 930 MG: 50 INJECTION, SOLUTION INTRAVENOUS at 20:04

## 2024-03-12 RX ADMIN — Medication: at 20:20

## 2024-03-12 RX ADMIN — HYDROMORPHONE HYDROCHLORIDE 1 MG: 1 INJECTION, SOLUTION INTRAMUSCULAR; INTRAVENOUS; SUBCUTANEOUS at 20:14

## 2024-03-12 RX ADMIN — Medication 2 G: at 21:32

## 2024-03-12 SDOH — SOCIAL STABILITY: SOCIAL INSECURITY: HAS ANYONE EVER THREATENED TO HURT YOUR FAMILY OR YOUR PETS?: NO

## 2024-03-12 SDOH — SOCIAL STABILITY: SOCIAL INSECURITY: HAVE YOU HAD THOUGHTS OF HARMING ANYONE ELSE?: NO

## 2024-03-12 SDOH — SOCIAL STABILITY: SOCIAL INSECURITY: ABUSE: ADULT

## 2024-03-12 SDOH — SOCIAL STABILITY: SOCIAL INSECURITY: DOES ANYONE TRY TO KEEP YOU FROM HAVING/CONTACTING OTHER FRIENDS OR DOING THINGS OUTSIDE YOUR HOME?: NO

## 2024-03-12 SDOH — SOCIAL STABILITY: SOCIAL INSECURITY: DO YOU FEEL ANYONE HAS EXPLOITED OR TAKEN ADVANTAGE OF YOU FINANCIALLY OR OF YOUR PERSONAL PROPERTY?: NO

## 2024-03-12 SDOH — SOCIAL STABILITY: SOCIAL INSECURITY: ARE THERE ANY APPARENT SIGNS OF INJURIES/BEHAVIORS THAT COULD BE RELATED TO ABUSE/NEGLECT?: NO

## 2024-03-12 SDOH — SOCIAL STABILITY: SOCIAL INSECURITY: DO YOU FEEL UNSAFE GOING BACK TO THE PLACE WHERE YOU ARE LIVING?: NO

## 2024-03-12 SDOH — SOCIAL STABILITY: SOCIAL INSECURITY: ARE YOU OR HAVE YOU BEEN THREATENED OR ABUSED PHYSICALLY, EMOTIONALLY, OR SEXUALLY BY ANYONE?: NO

## 2024-03-12 ASSESSMENT — ACTIVITIES OF DAILY LIVING (ADL)
TOILETING: INDEPENDENT
ADEQUATE_TO_COMPLETE_ADL: YES
HEARING - LEFT EAR: FUNCTIONAL
HEARING - RIGHT EAR: FUNCTIONAL
GROOMING: INDEPENDENT
DRESSING YOURSELF: INDEPENDENT
BATHING: INDEPENDENT
WALKS IN HOME: INDEPENDENT
JUDGMENT_ADEQUATE_SAFELY_COMPLETE_DAILY_ACTIVITIES: YES
FEEDING YOURSELF: INDEPENDENT
PATIENT'S MEMORY ADEQUATE TO SAFELY COMPLETE DAILY ACTIVITIES?: YES
LACK_OF_TRANSPORTATION: NO

## 2024-03-12 ASSESSMENT — LIFESTYLE VARIABLES
AUDIT-C TOTAL SCORE: 0
HOW MANY STANDARD DRINKS CONTAINING ALCOHOL DO YOU HAVE ON A TYPICAL DAY: PATIENT DOES NOT DRINK
SKIP TO QUESTIONS 9-10: 1
HOW OFTEN DO YOU HAVE A DRINK CONTAINING ALCOHOL: NEVER
HOW OFTEN DO YOU HAVE 6 OR MORE DRINKS ON ONE OCCASION: NEVER
AUDIT-C TOTAL SCORE: 0

## 2024-03-12 ASSESSMENT — PAIN DESCRIPTION - PAIN TYPE: TYPE: ACUTE PAIN

## 2024-03-12 ASSESSMENT — PAIN DESCRIPTION - PROGRESSION: CLINICAL_PROGRESSION: NOT CHANGED

## 2024-03-12 ASSESSMENT — PAIN SCALES - GENERAL
PAINLEVEL_OUTOF10: 6
PAINLEVEL_OUTOF10: 10 - WORST POSSIBLE PAIN
PAINLEVEL_OUTOF10: 9
PAINLEVEL_OUTOF10: 10 - WORST POSSIBLE PAIN
PAINLEVEL_OUTOF10: 7
PAINLEVEL_OUTOF10: 5 - MODERATE PAIN

## 2024-03-12 ASSESSMENT — PATIENT HEALTH QUESTIONNAIRE - PHQ9
SUM OF ALL RESPONSES TO PHQ9 QUESTIONS 1 & 2: 0
2. FEELING DOWN, DEPRESSED OR HOPELESS: NOT AT ALL
1. LITTLE INTEREST OR PLEASURE IN DOING THINGS: NOT AT ALL

## 2024-03-12 ASSESSMENT — PAIN - FUNCTIONAL ASSESSMENT
PAIN_FUNCTIONAL_ASSESSMENT: 0-10

## 2024-03-12 ASSESSMENT — COGNITIVE AND FUNCTIONAL STATUS - GENERAL
MOVING TO AND FROM BED TO CHAIR: A LITTLE
MOBILITY SCORE: 20
CLIMB 3 TO 5 STEPS WITH RAILING: A LITTLE
PATIENT BASELINE BEDBOUND: NO
WALKING IN HOSPITAL ROOM: A LITTLE
DAILY ACTIVITIY SCORE: 24
STANDING UP FROM CHAIR USING ARMS: A LITTLE

## 2024-03-12 ASSESSMENT — COLUMBIA-SUICIDE SEVERITY RATING SCALE - C-SSRS
6. HAVE YOU EVER DONE ANYTHING, STARTED TO DO ANYTHING, OR PREPARED TO DO ANYTHING TO END YOUR LIFE?: NO
1. IN THE PAST MONTH, HAVE YOU WISHED YOU WERE DEAD OR WISHED YOU COULD GO TO SLEEP AND NOT WAKE UP?: NO
2. HAVE YOU ACTUALLY HAD ANY THOUGHTS OF KILLING YOURSELF?: NO

## 2024-03-12 ASSESSMENT — PAIN DESCRIPTION - FREQUENCY: FREQUENCY: CONSTANT/CONTINUOUS

## 2024-03-12 ASSESSMENT — PAIN DESCRIPTION - LOCATION: LOCATION: HEAD

## 2024-03-12 ASSESSMENT — PAIN DESCRIPTION - DESCRIPTORS
DESCRIPTORS: ACHING
DESCRIPTORS: ACHING

## 2024-03-12 ASSESSMENT — PAIN DESCRIPTION - ONSET: ONSET: ONGOING

## 2024-03-12 NOTE — ED NOTES
After triage spoke with charge RN and Dr. Hdez attending. Patient moved to D due to bed availability      Lizbeth Tracy RN  03/12/24 1041

## 2024-03-12 NOTE — ED PROVIDER NOTES
EMERGENCY DEPARTMENT ENCOUNTER      Pt Name: Josue Quintanilla  MRN: [id number]  Birthdate 1970  Date of evaluation: 3/12/2024    HISTORY OF PRESENT ILLNESS    Josue Quintanilla is an 53 y.o. male with history including ADHD presenting to the emergency department for severe headache.  Patient has no history of migraines has had mild headaches in the past.  States on Friday 3 days ago patient had a sudden onset severe headache.  He states they feel like he was going to have a stroke.  Patient was hoping that the headache would improve and give it time over the weekend.  Patient took hydrocodone last night with no improvement of his head pain.  Earlier today he was having difficulty speaking.  Wife said he was acting abnormal.  He has also had fevers for the last few days tactile not measured.  He notes nausea and vomiting but no diarrhea, nasal congestion, rhinorrhea, cough.  No positive sick contacts.      PAST MEDICAL HISTORY   History reviewed. No pertinent past medical history.    SURGICAL HISTORY     History reviewed. No pertinent surgical history.    CURRENT MEDICATIONS       Previous Medications    IBUPROFEN 800 MG TABLET    Take 1 tablet (800 mg) by mouth 3 times a day as needed for mild pain (1 - 3) (pain).    LISDEXAMFETAMINE (VYVANSE) 40 MG CAPSULE    Take 1 capsule (40 mg) by mouth once daily.    MOMETASONE (NASONEX) 50 MCG/ACTUATION NASAL SPRAY    FOR DIRECTIONS ON HOW TO   TAKE THIS MEDICATION, READ THE [facility] MAIL SERVICE  INVOICE/RECEIPT    ONDANSETRON ODT (ZOFRAN-ODT) 8 MG DISINTEGRATING TABLET    Take 1 tablet (8 mg) by mouth every 8 hours if needed for nausea or vomiting for up to 7 days.    TADALAFIL (CIALIS) 10 MG TABLET    Take 1 tablet (10 mg) by mouth once daily. Before activity as needed       ALLERGIES     Ampicillin    FAMILY HISTORY       Family History   Problem Relation Name Age of Onset    Hypertension Mother          SOCIAL HISTORY       Social History     Socioeconomic History    Marital  status:      Spouse name: None    Number of children: None    Years of education: None    Highest education level: None   Occupational History    None   Tobacco Use    Smoking status: Former     Types: Cigarettes    Smokeless tobacco: Current     Types: Chew   Substance and Sexual Activity    Alcohol use: Not Currently     Comment: Occasional    Drug use: Not Currently    Sexual activity: None   Other Topics Concern    None   Social History Narrative    None     Social Determinants of Health     Financial Resource Strain: Not on file   Food Insecurity: Not on file   Transportation Needs: Not on file   Physical Activity: Not on file   Stress: Not on file   Social Connections: Not on file   Intimate Partner Violence: Not on file   Housing Stability: Not on file       PHYSICAL EXAM       ED Triage Vitals [03/12/24 1553]   Temperature Heart Rate Respirations BP   37.2 °C (99 °F) 86 18 (!) 157/99      Pulse Ox Temp Source Heart Rate Source Patient Position   96 % Temporal Monitor Sitting      BP Location FiO2 (%)     Right arm --       Physical Exam  Vitals and nursing note reviewed.   Constitutional:       General: He is not in acute distress.     Appearance: He is well-developed.   HENT:      Head: Normocephalic and atraumatic.   Eyes:      Conjunctiva/sclera: Conjunctivae normal.   Cardiovascular:      Rate and Rhythm: Normal rate and regular rhythm.      Heart sounds: No murmur heard.  Pulmonary:      Effort: Pulmonary effort is normal. No respiratory distress.      Breath sounds: Normal breath sounds.   Abdominal:      Palpations: Abdomen is soft.      Tenderness: There is no abdominal tenderness.   Musculoskeletal:         General: No swelling.      Cervical back: Neck supple.   Skin:     General: Skin is warm and dry.      Capillary Refill: Capillary refill takes less than 2 seconds.   Neurological:      Mental Status: He is alert.      Cranial Nerves: No cranial nerve deficit or facial asymmetry.       Sensory: No sensory deficit.      Motor: No weakness.      Comments: Negative brudzinski and michael   Psychiatric:         Mood and Affect: Mood normal.        DIAGNOSTIC RESULTS     LABS:  Labs Reviewed   CBC WITH AUTO DIFFERENTIAL   COMPREHENSIVE METABOLIC PANEL   SARS-COV-2 AND INFLUENZA A/B PCR       All other labs were within normal range or not returned as of this dictation.    Imaging  No orders to display        Procedures  Procedures     EMERGENCY DEPARTMENT COURSE/MDM:   Medical Decision Making  Josue Quintanilal is an 53 y.o. male with history including ADHD presenting to the emergency department for severe headache.  Based on history and exam concern for subarachnoid or possible meningitis due to fever. Ct imaging, labs an CSF tap pending.          =================Attending note===============    The patient was seen by the resident/fellow.  I have personally performed a substantive portion of the encounter.  I have seen and examined the patient; agree with the workup, evaluation, MDM,   management and diagnosis.  The care plan has been discussed with the resident; I have reviewed the resident's note and agree with the documented findings.      This is a 53 y.o. male who presents to ER with headache.  Headache started on Friday.  Has been getting progressively worse.  He does not have a significant headache history.  His wife states has had some mild confusion since the headache started.  He had some nausea last night.  He had a subjective fever.  He is only had some mild headaches in the past when he had sinus infections.  He has no significant headache history.  No injury.  No diarrhea.  No chest pain or shortness of breath.  He does take Vyvanse.  No blood thinners.  He denies diabetes or hypertension or hyperlipidemia.  He did have Lyme disease in October and did not finish his course of antibiotics.  Heart is regular.  Lungs are clear.  Abdomen is soft and nontender.  No meningismus.  Full range of  motion of the neck.  He is awake and alert and conversant.  NIH stroke score is a 0.    White count is slightly low which could be concerning for a viral infection.  Platelets are also slightly low.  Not low enough that I would stop us from doing a lumbar puncture.  They are over 50,000.  Flu and COVID are negative.  Patient does have hyponatremia.  He denies any increasing free water intake or excessive beer intake.  CT of the head had no acute findings.    Lumbar puncture was completed.  There was slightly increased protein.  Glucose was normal.  There is no xanthochromia.  There is a possibility of viral meningitis.    Patient is admitted to the hospital for further treatment and evaluation.          ==========================================          Diagnoses as of 03/12/24 2136   Headache   Hyponatremia   Leukopenia, unspecified type        External records reviewed: recent inpatient, clinic, and prior ED notes  Labs and Diagnostic imaging independently reviewed/interpreted by me.    Patient plan, care, lab results and imaging were all discussed with attending.    ED Medications administered this visit:  Medications - No data to display  New Prescriptions from this visit:    New Prescriptions    No medications on file       (Please note that portions of this note were completed with a voice recognition program.  Efforts were made to edit the dictations but occasionally words are mis-transcribed.)     Surya Hdez,   03/13/24 0964

## 2024-03-13 ENCOUNTER — APPOINTMENT (OUTPATIENT)
Dept: RADIOLOGY | Facility: HOSPITAL | Age: 54
DRG: 975 | End: 2024-03-13
Payer: COMMERCIAL

## 2024-03-13 PROBLEM — J01.90 ACUTE SINUSITIS: Status: RESOLVED | Noted: 2024-03-13 | Resolved: 2024-03-13

## 2024-03-13 PROBLEM — R50.9 FEVER: Status: ACTIVE | Noted: 2024-03-13

## 2024-03-13 PROBLEM — D49.2 NEOPLASM OF SOFT TISSUES: Status: RESOLVED | Noted: 2024-03-13 | Resolved: 2024-03-13

## 2024-03-13 PROBLEM — E87.1 HYPONATREMIA: Status: ACTIVE | Noted: 2024-03-13

## 2024-03-13 PROBLEM — D69.6 THROMBOCYTOPENIA (CMS-HCC): Status: ACTIVE | Noted: 2024-03-13

## 2024-03-13 PROBLEM — G93.40 ENCEPHALOPATHY: Status: ACTIVE | Noted: 2024-03-13

## 2024-03-13 LAB
ALBUMIN SERPL BCP-MCNC: 3.3 G/DL (ref 3.4–5)
AMPHETAMINES UR QL SCN: NORMAL
ANION GAP SERPL CALC-SCNC: 11 MMOL/L (ref 10–20)
APTT PPP: 41 SECONDS (ref 27–38)
BARBITURATES UR QL SCN: NORMAL
BASOPHILS # BLD AUTO: 0.01 X10*3/UL (ref 0–0.1)
BASOPHILS NFR BLD AUTO: 0.4 %
BENZODIAZ UR QL SCN: NORMAL
BUN SERPL-MCNC: 15 MG/DL (ref 6–23)
BZE UR QL SCN: NORMAL
C GATTII+NEOFOR DNA CSF QL NAA+NON-PROBE: NOT DETECTED
C3 SERPL-MCNC: 76 MG/DL (ref 87–200)
C4 SERPL-MCNC: 34 MG/DL (ref 10–50)
CALCIUM SERPL-MCNC: 7.1 MG/DL (ref 8.6–10.3)
CANNABINOIDS UR QL SCN: NORMAL
CHLORIDE SERPL-SCNC: 96 MMOL/L (ref 98–107)
CMV DNA CSF QL NAA+NON-PROBE: NOT DETECTED
CO2 SERPL-SCNC: 25 MMOL/L (ref 21–32)
COLOR CSF: COLORLESS
CREAT SERPL-MCNC: 1.03 MG/DL (ref 0.5–1.3)
CRYPTOC AG SPEC QL LA: NEGATIVE
E COLI K1 DNA CSF QL NAA+NON-PROBE: NOT DETECTED
EBV EA IGG SER QL: NEGATIVE
EBV NA AB SER QL: POSITIVE
EBV VCA IGG SER IA-ACNC: POSITIVE
EBV VCA IGM SER IA-ACNC: NEGATIVE
EGFRCR SERPLBLD CKD-EPI 2021: 88 ML/MIN/1.73M*2
EOSINOPHIL # BLD AUTO: 0 X10*3/UL (ref 0–0.7)
EOSINOPHIL NFR BLD AUTO: 0 %
ERYTHROCYTE [DISTWIDTH] IN BLOOD BY AUTOMATED COUNT: 12.4 % (ref 11.5–14.5)
EV RNA CSF QL NAA+NON-PROBE: NOT DETECTED
FENTANYL+NORFENTANYL UR QL SCN: NORMAL
FOLATE SERPL-MCNC: 14.4 NG/ML
GLUCOSE BLD MANUAL STRIP-MCNC: 98 MG/DL (ref 74–99)
GLUCOSE SERPL-MCNC: 110 MG/DL (ref 74–99)
GP B STREP DNA CSF QL NAA+NON-PROBE: NOT DETECTED
HAEM INFLU DNA CSF QL NAA+NON-PROBE: NOT DETECTED
HAV IGM SER QL: NONREACTIVE
HBV CORE IGM SER QL: NONREACTIVE
HBV SURFACE AG SERPL QL IA: NONREACTIVE
HCT VFR BLD AUTO: 41.2 % (ref 41–52)
HCV AB SER QL: NONREACTIVE
HGB BLD-MCNC: 14.5 G/DL (ref 13.5–17.5)
HHV6 DNA CSF QL NAA+NON-PROBE: NOT DETECTED
HIV 1+2 AB+HIV1 P24 AG SERPL QL IA: ABNORMAL
HOLD SPECIMEN: NORMAL
HSV1 DNA CSF QL NAA+NON-PROBE: NOT DETECTED
HSV2 DNA CSF QL NAA+NON-PROBE: NOT DETECTED
IMM GRANULOCYTES # BLD AUTO: 0 X10*3/UL (ref 0–0.7)
IMM GRANULOCYTES NFR BLD AUTO: 0 % (ref 0–0.9)
INR PPP: 1.4 (ref 0.9–1.1)
L MONOCYTOG DNA CSF QL NAA+NON-PROBE: NOT DETECTED
LEGIONELLA AG UR QL: NEGATIVE
LYMPHOCYTES # BLD AUTO: 0.99 X10*3/UL (ref 1.2–4.8)
LYMPHOCYTES NFR BLD AUTO: 44 %
MAGNESIUM SERPL-MCNC: 1.66 MG/DL (ref 1.6–2.4)
MCH RBC QN AUTO: 28.3 PG (ref 26–34)
MCHC RBC AUTO-ENTMCNC: 35.2 G/DL (ref 32–36)
MCV RBC AUTO: 80 FL (ref 80–100)
METHADONE UR QL SCN: NORMAL
MONOCYTES # BLD AUTO: 0.11 X10*3/UL (ref 0.1–1)
MONOCYTES NFR BLD AUTO: 4.9 %
N MEN DNA CSF QL NAA+NON-PROBE: NOT DETECTED
NEUTROPHILS # BLD AUTO: 1.14 X10*3/UL (ref 1.2–7.7)
NEUTROPHILS NFR BLD AUTO: 50.7 %
NRBC BLD-RTO: 0 /100 WBCS (ref 0–0)
OPIATES UR QL SCN: NORMAL
OSMOLALITY SERPL: 267 MOSM/KG (ref 280–300)
OSMOLALITY UR: 771 MOSM/KG (ref 200–1200)
OXYCODONE+OXYMORPHONE UR QL SCN: NORMAL
PARECHOVIRUS A RNA CSF QL NAA+NON-PROBE: NOT DETECTED
PCP UR QL SCN: NORMAL
PHOSPHATE SERPL-MCNC: 2.8 MG/DL (ref 2.5–4.9)
PLATELET # BLD AUTO: 73 X10*3/UL (ref 150–450)
PLATELET # BLD AUTO: 85 X10*3/UL (ref 150–450)
POTASSIUM SERPL-SCNC: 3.6 MMOL/L (ref 3.5–5.3)
PROCALCITONIN SERPL-MCNC: 0.07 NG/ML
PROT SERPL-MCNC: 5.3 G/DL (ref 6.4–8.2)
PROT UR-ACNC: 207 MG/DL (ref 5–25)
PROTHROMBIN TIME: 15.3 SECONDS (ref 9.8–12.8)
RBC # BLD AUTO: 5.13 X10*6/UL (ref 4.5–5.9)
S PNEUM DNA CSF QL NAA+NON-PROBE: NOT DETECTED
SODIUM SERPL-SCNC: 128 MMOL/L (ref 136–145)
TREPONEMA PALLIDUM IGG+IGM AB [PRESENCE] IN SERUM OR PLASMA BY IMMUNOASSAY: REACTIVE
VIT B12 SERPL-MCNC: 845 PG/ML (ref 211–911)
VZV DNA CSF QL NAA+NON-PROBE: NOT DETECTED
WBC # BLD AUTO: 2.3 X10*3/UL (ref 4.4–11.3)

## 2024-03-13 PROCEDURE — 86318 IA INFECTIOUS AGENT ANTIBODY: CPT | Mod: STJLAB | Performed by: INTERNAL MEDICINE

## 2024-03-13 PROCEDURE — 74177 CT ABD & PELVIS W/CONTRAST: CPT | Performed by: RADIOLOGY

## 2024-03-13 PROCEDURE — 86038 ANTINUCLEAR ANTIBODIES: CPT | Mod: STJLAB

## 2024-03-13 PROCEDURE — 70553 MRI BRAIN STEM W/O & W/DYE: CPT

## 2024-03-13 PROCEDURE — 82947 ASSAY GLUCOSE BLOOD QUANT: CPT

## 2024-03-13 PROCEDURE — 71250 CT THORAX DX C-: CPT | Performed by: RADIOLOGY

## 2024-03-13 PROCEDURE — 99223 1ST HOSP IP/OBS HIGH 75: CPT

## 2024-03-13 PROCEDURE — 2500000004 HC RX 250 GENERAL PHARMACY W/ HCPCS (ALT 636 FOR OP/ED)

## 2024-03-13 PROCEDURE — 2500000004 HC RX 250 GENERAL PHARMACY W/ HCPCS (ALT 636 FOR OP/ED): Performed by: STUDENT IN AN ORGANIZED HEALTH CARE EDUCATION/TRAINING PROGRAM

## 2024-03-13 PROCEDURE — 71250 CT THORAX DX C-: CPT

## 2024-03-13 PROCEDURE — 86481 TB AG RESPONSE T-CELL SUSP: CPT

## 2024-03-13 PROCEDURE — 87449 NOS EACH ORGANISM AG IA: CPT | Performed by: INTERNAL MEDICINE

## 2024-03-13 PROCEDURE — 86663 EPSTEIN-BARR ANTIBODY: CPT | Mod: STJLAB | Performed by: STUDENT IN AN ORGANIZED HEALTH CARE EDUCATION/TRAINING PROGRAM

## 2024-03-13 PROCEDURE — 87536 HIV-1 QUANT&REVRSE TRNSCRPJ: CPT | Mod: STJLAB | Performed by: INTERNAL MEDICINE

## 2024-03-13 PROCEDURE — 86780 TREPONEMA PALLIDUM: CPT | Mod: STJLAB | Performed by: INTERNAL MEDICINE

## 2024-03-13 PROCEDURE — 70553 MRI BRAIN STEM W/O & W/DYE: CPT | Performed by: RADIOLOGY

## 2024-03-13 PROCEDURE — 83735 ASSAY OF MAGNESIUM: CPT

## 2024-03-13 PROCEDURE — 1100000001 HC PRIVATE ROOM DAILY

## 2024-03-13 PROCEDURE — 80069 RENAL FUNCTION PANEL: CPT | Performed by: STUDENT IN AN ORGANIZED HEALTH CARE EDUCATION/TRAINING PROGRAM

## 2024-03-13 PROCEDURE — 99222 1ST HOSP IP/OBS MODERATE 55: CPT

## 2024-03-13 PROCEDURE — 2500000001 HC RX 250 WO HCPCS SELF ADMINISTERED DRUGS (ALT 637 FOR MEDICARE OP)

## 2024-03-13 PROCEDURE — 88185 FLOWCYTOMETRY/TC ADD-ON: CPT | Mod: TC,STJLAB | Performed by: INTERNAL MEDICINE

## 2024-03-13 PROCEDURE — A9575 INJ GADOTERATE MEGLUMI 0.1ML: HCPCS

## 2024-03-13 PROCEDURE — 87800 DETECT AGNT MULT DNA DIREC: CPT | Mod: STJLAB | Performed by: INTERNAL MEDICINE

## 2024-03-13 PROCEDURE — 74177 CT ABD & PELVIS W/CONTRAST: CPT

## 2024-03-13 PROCEDURE — 36415 COLL VENOUS BLD VENIPUNCTURE: CPT | Performed by: INTERNAL MEDICINE

## 2024-03-13 PROCEDURE — 2550000001 HC RX 255 CONTRASTS: Performed by: STUDENT IN AN ORGANIZED HEALTH CARE EDUCATION/TRAINING PROGRAM

## 2024-03-13 PROCEDURE — 36415 COLL VENOUS BLD VENIPUNCTURE: CPT

## 2024-03-13 PROCEDURE — 80307 DRUG TEST PRSMV CHEM ANLYZR: CPT | Performed by: STUDENT IN AN ORGANIZED HEALTH CARE EDUCATION/TRAINING PROGRAM

## 2024-03-13 RX ORDER — GADOTERATE MEGLUMINE 376.9 MG/ML
19 INJECTION INTRAVENOUS
Status: COMPLETED | OUTPATIENT
Start: 2024-03-13 | End: 2024-03-13

## 2024-03-13 RX ORDER — ONDANSETRON HYDROCHLORIDE 2 MG/ML
4 INJECTION, SOLUTION INTRAVENOUS EVERY 4 HOURS PRN
Status: DISCONTINUED | OUTPATIENT
Start: 2024-03-13 | End: 2024-03-15 | Stop reason: HOSPADM

## 2024-03-13 RX ORDER — SODIUM CHLORIDE 9 MG/ML
125 INJECTION, SOLUTION INTRAVENOUS CONTINUOUS
Status: ACTIVE | OUTPATIENT
Start: 2024-03-13 | End: 2024-03-13

## 2024-03-13 RX ADMIN — SODIUM CHLORIDE 125 ML/HR: 9 INJECTION, SOLUTION INTRAVENOUS at 09:47

## 2024-03-13 RX ADMIN — OXYCODONE HYDROCHLORIDE 5 MG: 5 TABLET ORAL at 18:26

## 2024-03-13 RX ADMIN — ACETAMINOPHEN 650 MG: 325 TABLET ORAL at 03:51

## 2024-03-13 RX ADMIN — ENOXAPARIN SODIUM 40 MG: 40 INJECTION SUBCUTANEOUS at 01:16

## 2024-03-13 RX ADMIN — IOHEXOL 75 ML: 350 INJECTION, SOLUTION INTRAVENOUS at 11:42

## 2024-03-13 RX ADMIN — ONDANSETRON 4 MG: 2 INJECTION INTRAMUSCULAR; INTRAVENOUS at 03:51

## 2024-03-13 RX ADMIN — SODIUM CHLORIDE 125 ML/HR: 9 INJECTION, SOLUTION INTRAVENOUS at 01:16

## 2024-03-13 RX ADMIN — GADOTERATE MEGLUMINE 19 ML: 376.9 INJECTION INTRAVENOUS at 12:07

## 2024-03-13 SDOH — ECONOMIC STABILITY: INCOME INSECURITY: IN THE PAST 12 MONTHS, HAS THE ELECTRIC, GAS, OIL, OR WATER COMPANY THREATENED TO SHUT OFF SERVICE IN YOUR HOME?: NO

## 2024-03-13 SDOH — ECONOMIC STABILITY: FOOD INSECURITY: WITHIN THE PAST 12 MONTHS, YOU WORRIED THAT YOUR FOOD WOULD RUN OUT BEFORE YOU GOT MONEY TO BUY MORE.: NEVER TRUE

## 2024-03-13 SDOH — ECONOMIC STABILITY: FOOD INSECURITY: WITHIN THE PAST 12 MONTHS, THE FOOD YOU BOUGHT JUST DIDN'T LAST AND YOU DIDN'T HAVE MONEY TO GET MORE.: NEVER TRUE

## 2024-03-13 ASSESSMENT — PAIN - FUNCTIONAL ASSESSMENT
PAIN_FUNCTIONAL_ASSESSMENT: 0-10

## 2024-03-13 ASSESSMENT — COGNITIVE AND FUNCTIONAL STATUS - GENERAL
MOVING TO AND FROM BED TO CHAIR: A LITTLE
TOILETING: A LITTLE
DRESSING REGULAR UPPER BODY CLOTHING: A LITTLE
HELP NEEDED FOR BATHING: A LITTLE
DAILY ACTIVITIY SCORE: 20
HELP NEEDED FOR BATHING: A LITTLE
DAILY ACTIVITIY SCORE: 20
DRESSING REGULAR LOWER BODY CLOTHING: A LITTLE
MOBILITY SCORE: 20
STANDING UP FROM CHAIR USING ARMS: A LITTLE
TOILETING: A LITTLE
MOBILITY SCORE: 20
DRESSING REGULAR UPPER BODY CLOTHING: A LITTLE
DRESSING REGULAR LOWER BODY CLOTHING: A LITTLE
CLIMB 3 TO 5 STEPS WITH RAILING: A LITTLE
STANDING UP FROM CHAIR USING ARMS: A LITTLE
WALKING IN HOSPITAL ROOM: A LITTLE
MOVING TO AND FROM BED TO CHAIR: A LITTLE
WALKING IN HOSPITAL ROOM: A LITTLE
CLIMB 3 TO 5 STEPS WITH RAILING: A LITTLE

## 2024-03-13 ASSESSMENT — PAIN SCALES - GENERAL
PAINLEVEL_OUTOF10: 2
PAINLEVEL_OUTOF10: 0 - NO PAIN
PAINLEVEL_OUTOF10: 5 - MODERATE PAIN
PAINLEVEL_OUTOF10: 6
PAINLEVEL_OUTOF10: 0 - NO PAIN
PAINLEVEL_OUTOF10: 4

## 2024-03-13 ASSESSMENT — ACTIVITIES OF DAILY LIVING (ADL): LACK_OF_TRANSPORTATION: NO

## 2024-03-13 ASSESSMENT — PAIN DESCRIPTION - ORIENTATION: ORIENTATION: RIGHT;POSTERIOR

## 2024-03-13 NOTE — NURSING NOTE
0915- Teaching services notified of patient having 2mm nonreactive pupils. Teaching rounding at bedside and assessed patient.   1125- MRI form complete, patient off floor to go to CT and then to MRI.  1225- Patient back in room. Tele applied and neuro check completed  1635- Patient refusing seizure pads on bed. Teaching services notified and patient and wife educated on safety of precautions.   EOS: Patient remained A&O x4 throughout this shift noting flat affect from patient when doing care. Patient was assisted in using the urinal with no complaints and wife at bedside. Patient neuro checks remain unchanged noting non reactive pupils and slight headache, decreased with relaxation and PRN oxycodone. Patient remains drowsy but became more awake as the shift went on. Patient safety maintained with bed alarm on and audible with call bell within reach.

## 2024-03-13 NOTE — PROGRESS NOTES
03/13/24 1156   Discharge Planning   Living Arrangements Spouse/significant other   Support Systems Spouse/significant other   Assistance Needed none   Type of Residence Private residence   Patient expects to be discharged to: home   Does the patient need discharge transport arranged? No   Transportation Needs   In the past 12 months, has lack of transportation kept you from medical appointments or from getting medications? no   In the past 12 months, has lack of transportation kept you from meetings, work, or from getting things needed for daily living? No     Met with patient and patient's spouse Jo Ann at bedside. Patient lives at home in a ranch with spouse in [place], is independent, no use of DME. PCP is Darion Washington. Pt plans to return home at d/c.

## 2024-03-13 NOTE — NURSING NOTE
Admit - patient arrived to [address] [place] [address], room 3028, at this time.  Droplet precautions in place, patient oriented to room/floor.

## 2024-03-13 NOTE — CARE PLAN
The patient's goals for the shift include      The clinical goals for the shift include see POC      Problem: Pain  Goal: Takes deep breaths with improved pain control throughout the shift  Outcome: Progressing  Goal: Turns in bed with improved pain control throughout the shift  Outcome: Progressing  Goal: Walks with improved pain control throughout the shift  Outcome: Progressing  Goal: Performs ADL's with improved pain control throughout shift  Outcome: Progressing  Goal: Participates in PT with improved pain control throughout the shift  Outcome: Progressing  Goal: Free from opioid side effects throughout the shift  Outcome: Progressing  Goal: Free from acute confusion related to pain meds throughout the shift  Outcome: Progressing     Problem: Pain - Adult  Goal: Verbalizes/displays adequate comfort level or baseline comfort level  Outcome: Progressing     Problem: Safety - Adult  Goal: Free from fall injury  Outcome: Progressing     Problem: Discharge Planning  Goal: Discharge to home or other facility with appropriate resources  Outcome: Progressing     Problem: Chronic Conditions and Co-morbidities  Goal: Patient's chronic conditions and co-morbidity symptoms are monitored and maintained or improved  Outcome: Progressing     Problem: Pain  Goal: My pain/discomfort is manageable  Outcome: Progressing     Problem: Safety  Goal: Patient will be injury free during hospitalization  Outcome: Progressing  Goal: I will remain free of falls  Outcome: Progressing     Problem: Daily Care  Goal: Daily care needs are met  Outcome: Progressing     Problem: Psychosocial Needs  Goal: Demonstrates ability to cope with hospitalization/illness  Outcome: Progressing  Goal: Collaborate with me, my family, and caregiver to identify my specific goals  Outcome: Progressing     Problem: Discharge Barriers  Goal: My discharge needs are met  Outcome: Progressing

## 2024-03-13 NOTE — CARE PLAN
The patient's goals for the shift include  remaining comfortable throughout shift.    The clinical goals for the shift include Remain hemodynamically stable      Problem: Pain  Goal: Takes deep breaths with improved pain control throughout the shift  Outcome: Progressing  Goal: Turns in bed with improved pain control throughout the shift  Outcome: Progressing  Goal: Walks with improved pain control throughout the shift  Outcome: Progressing  Goal: Performs ADL's with improved pain control throughout shift  Outcome: Progressing  Goal: Participates in PT with improved pain control throughout the shift  Outcome: Progressing  Goal: Free from opioid side effects throughout the shift  Outcome: Progressing  Goal: Free from acute confusion related to pain meds throughout the shift  Outcome: Progressing     Problem: Pain - Adult  Goal: Verbalizes/displays adequate comfort level or baseline comfort level  Outcome: Progressing     Problem: Safety - Adult  Goal: Free from fall injury  Outcome: Progressing     Problem: Discharge Planning  Goal: Discharge to home or other facility with appropriate resources  Outcome: Progressing     Problem: Chronic Conditions and Co-morbidities  Goal: Patient's chronic conditions and co-morbidity symptoms are monitored and maintained or improved  Outcome: Progressing     Problem: Pain  Goal: My pain/discomfort is manageable  Outcome: Progressing     Problem: Safety  Goal: Patient will be injury free during hospitalization  Outcome: Progressing  Goal: I will remain free of falls  Outcome: Progressing     Problem: Daily Care  Goal: Daily care needs are met  Outcome: Progressing     Problem: Psychosocial Needs  Goal: Demonstrates ability to cope with hospitalization/illness  Outcome: Progressing  Goal: Collaborate with me, my family, and caregiver to identify my specific goals  Outcome: Progressing     Problem: Discharge Barriers  Goal: My discharge needs are met  Outcome: Progressing
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
## 2024-03-13 NOTE — HOSPITAL COURSE
Patient is a 52 y/o M with PMHx significant for ADHD on Vyvanse who initially presented to the hospital with persistent headache. Upon initial evaluation, the patient was intermittently febrile without resolution with Tylenol, but was otherwise hemodynamically stable. Initial labs were significant for hyponatremia with sodium 125, leukopenia with WBC 2.3, and thrombocytopenia with platelets 85. There was concern for meningitis vs. CNS infection and an LP was performed showing protein 55, glucose 69, lymphocyte 1, LD 25. Additional PCR studies of CSF including HSV, HHV6, Ecoli, Herpes Simplex 1 and 2, Parechovirus, Varicella, Haemophilus, listeria, neisseria, strep agalactiae and pneumoniae, CMV, enterovirus, cryptococcus were all negative. CT head was negative for acute intracranial abnormality Given unclear etiology of symptoms, the patient was admitted to the general medicine service for further evaluation and symptoms. Suspected that this was metabolic encephalopathy secondary to possible aseptic meningitis vs. Acute viral infection vs. Symptomatic hyponatremia. During admission MRI brain and CT abdomen were both ordered to assess for possible abscess or other abnormality, but were negative. HIV screen was initially reactive, but reflex confirmation antigen was negative. Syphilis screen was reactive, but RPR was negative. Neurology was consulted, but had low suspicion for secondary CNS infection, RCVS, CVST, stroke, meningitis/encephalitis, then signed off. ID was consulted and ordered HIV PCR, CD4/8, CSF VDRL, TB spot, Fungitell.  Unfortunately, HIV RNA PCR resulted in >10,000,000 detected.

## 2024-03-13 NOTE — H&P
History Of Present Illness  Josue Quintanilla is a 53 y.o. male with no know medical history presenting with altered mental status and headache.  Patient is accompanied by his wife who provides additional history.  Symptoms started on Friday and were progressively worsening.  He initially had a intractable headache that did not respond to Tylenol, ibuprofen or Norco. Headache is described as shooting pains across the head and are diffuse all over the head.  Denies recent travel, exposure to hotel/conference area/air conditioning units, he is unaware of anyone near him being sick.  Today, patient's wife reports that he was saying nonsensical things and that she was concerned that his headache has not resolved and presented to the emergency department for evaluation.  He does have a history of recurrent upper respiratory infections.     Denies chills, weight loss, lightheadedness, dizziness, vision changes,  CP, SOB, palpitations, vomiting, diarrhea, abd pain, black/bloody stools, dysuria, polyuria, hematuria.    PMH: as above  PSH:denies  FMH: reviewed and noncontributory  SocHx: chews tobacco, 3 drinks/wk, denies drug use  Allergies: reviewed  CODE STATUS: full code    ED course:  Initial Vitals: Temperature 37.2, Pulse 86, RR 18, /99, O2 96 on RA  Labs: Sodium 125, chloride 90, calcium 8.5, ast 75, alt 43, crp <0.10, white count 2.3, hemoglobin 14.5, platelets 85, flu/COVID negative, CSF studies show less than 25 LD/55 total protein/69 glucose/1 lymphocyte/less than 1000 RBCs  Imaging: Chest x-ray no acute cardiopulmonary process/CT head without acute intracranial finding.    Interventions: Vancomycin, Tylenol, acyclovir, Dilaudid, Merrem, morphine, 1 L NS bolus    Past Medical History  History reviewed. No pertinent past medical history.    Surgical History  History reviewed. No pertinent surgical history.     Social History  He reports that he has quit smoking. His smoking use included cigarettes. His smokeless  tobacco use includes chew. He reports that he does not currently use alcohol. He reports that he does not currently use drugs.    Family History  Family History   Problem Relation Name Age of Onset    Hypertension Mother          Allergies  Ampicillin    12-system ROS negative except as documented in HPI     Physical Exam    Constitutional: Well developed, awake/alert/oriented to person, birthdate, year, location, no distress, alert and cooperative, nontoxic  HEENT: anicteric sclera, eomi, no oral lesions noted  Cardiovascular: Regular, rate and rhythm, no murmurs, 2+ equal pulses of the extremities, normal S1 and S2  Respiratory/Thorax: Patent airways, CTAB, normal breath sounds with good chest expansion, thorax symmetric, no conversational dyspnea  Gastrointestinal: +BS, Nondistended, soft, non-tender, no rebound tenderness or guarding  Musculoskeletal: ROM intact, no joint swelling, normal strength  Extremities: normal extremities, no edema  Skin: warm and dry. No rashes nor lesions noted  Neurological: alert and oriented x3, intact senses, normal strength, follows commands, clear speech, no facial droop, 5/5 strength    Last Recorded Vitals  Blood pressure 100/67, pulse 81, temperature (!) 38.8 °C (101.8 °F), resp. rate 18, height 1.829 m (6'), weight 93 kg (205 lb), SpO2 (!) 93 %.    Relevant Results  Results for orders placed or performed during the hospital encounter of 03/12/24 (from the past 24 hour(s))   CBC and Auto Differential   Result Value Ref Range    WBC 2.3 (L) 4.4 - 11.3 x10*3/uL    nRBC 0.0 0.0 - 0.0 /100 WBCs    RBC 5.13 4.50 - 5.90 x10*6/uL    Hemoglobin 14.5 13.5 - 17.5 g/dL    Hematocrit 41.2 41.0 - 52.0 %    MCV 80 80 - 100 fL    MCH 28.3 26.0 - 34.0 pg    MCHC 35.2 32.0 - 36.0 g/dL    RDW 12.4 11.5 - 14.5 %    Platelets 85 (L) 150 - 450 x10*3/uL    Neutrophils % 50.7 40.0 - 80.0 %    Immature Granulocytes %, Automated 0.0 0.0 - 0.9 %    Lymphocytes % 44.0 13.0 - 44.0 %    Monocytes % 4.9  2.0 - 10.0 %    Eosinophils % 0.0 0.0 - 6.0 %    Basophils % 0.4 0.0 - 2.0 %    Neutrophils Absolute 1.14 (L) 1.20 - 7.70 x10*3/uL    Immature Granulocytes Absolute, Automated 0.00 0.00 - 0.70 x10*3/uL    Lymphocytes Absolute 0.99 (L) 1.20 - 4.80 x10*3/uL    Monocytes Absolute 0.11 0.10 - 1.00 x10*3/uL    Eosinophils Absolute 0.00 0.00 - 0.70 x10*3/uL    Basophils Absolute 0.01 0.00 - 0.10 x10*3/uL   Comprehensive metabolic panel   Result Value Ref Range    Glucose 100 (H) 74 - 99 mg/dL    Sodium 125 (L) 136 - 145 mmol/L    Potassium 3.8 3.5 - 5.3 mmol/L    Chloride 90 (L) 98 - 107 mmol/L    Bicarbonate 31 21 - 32 mmol/L    Anion Gap 8 (L) 10 - 20 mmol/L    Urea Nitrogen 14 6 - 23 mg/dL    Creatinine 1.28 0.50 - 1.30 mg/dL    eGFR 68 >60 mL/min/1.73m*2    Calcium 8.5 (L) 8.6 - 10.3 mg/dL    Albumin 4.1 3.4 - 5.0 g/dL    Alkaline Phosphatase 38 33 - 120 U/L    Total Protein 6.6 6.4 - 8.2 g/dL    AST 74 (H) 9 - 39 U/L    Bilirubin, Total 0.5 0.0 - 1.2 mg/dL    ALT 43 10 - 52 U/L   Sars-CoV-2 and Influenza A/B PCR   Result Value Ref Range    Flu A Result Not Detected Not Detected    Flu B Result Not Detected Not Detected    Coronavirus 2019, PCR Not Detected Not Detected   Light Blue Top   Result Value Ref Range    Extra Tube Hold for add-ons.    PST Top   Result Value Ref Range    Extra Tube Hold for add-ons.    Mononucleosis screen   Result Value Ref Range    Mononucleosis Screen Negative Negative   C-reactive protein   Result Value Ref Range    C-Reactive Protein <0.10 <1.00 mg/dL   CSF Cell Count   Result Value Ref Range    Tube Number, CSF Tube 4       Color, CSF Colorless Colorless    Clarity, CSF Clear Clear    Color, Supernatant CSF Colorless      WBC, CSF 1 1 - 5 /uL    RBC, CSF <1,000 (H) 0 - 5 /uL   CSF Differential   Result Value Ref Range    Neutrophils %, Manual, CSF 0 0 - 5 %    Lymphocytes %, Manual,  (H) 28 - 96 %    Mono/Macrophages %, Manual, CSF 0 (L) 16 - 56 %    Eosinophils %, Manual, CSF 0  Rare %    Basophils %, Manual, CSF 0 Not Established %    Immature Granulocytes %, Manual, CSF 0 Not Established %    Blasts %, Manual, CSF 0 Not Established %    Unclassified Cells %, Manual, CSF 0 Not Established %    Plasma Cells %, Manual, CSF 0 Not Established %    Total Cells Counted,     Glucose, CSF   Result Value Ref Range    Glucose, CSF 69 40 - 70 mg/dL   Protein, Total CSF   Result Value Ref Range    Total Protein, CSF 55 (H) 15 - 45 mg/dL   Lactate   Result Value Ref Range    Lactate 0.8 0.4 - 2.0 mmol/L   Reticulocytes   Result Value Ref Range    Retic % 0.3 (L) 0.5 - 2.0 %    Retic Absolute 0.016 (L) 0.022 - 0.118 x10*6/uL    Reticulocyte Hemoglobin 32 28 - 38 pg    Immature Retic fraction 1.0 <=16.0 %     XR chest 1 view    Result Date: 3/12/2024  Interpreted By:  Tomer Giron, STUDY: XR CHEST 1 VIEW;  3/12/2024 6:48 pm   INDICATION: Signs/Symptoms:hyponatremia, rule out pneumonia.   COMPARISON: None.   ACCESSION NUMBER(S): [id number]   ORDERING CLINICIAN: BEE SCHUSTER   FINDINGS:     The cardiomediastinal silhouette and pulmonary vasculature are within normal limits. No consolidation, pleural effusion or pneumothorax.         No acute cardiopulmonary process.     MACRO: None.   Signed by: Tomer Giron 3/12/2024 6:51 PM Dictation workstation:   [id number]    CT head wo IV contrast    Result Date: 3/12/2024  Interpreted By:  Tod Velasquez, STUDY: CT HEAD WO IV CONTRAST;  3/12/2024 5:07 pm   INDICATION: Signs/Symptoms:headache.   COMPARISON: 01/22/2017   ACCESSION NUMBER(S): [id number]   ORDERING CLINICIAN: BEE SCHUSTER   TECHNIQUE: Noncontrast axial CT scan of head was performed. Angled reformats in brain and bone windows were generated. The images were reviewed in bone, brain, blood and soft tissue windows.   FINDINGS: There is no intra/extra-axial fluid collection, mass effect, or midline shift. The gray/white matter junction is preserved. The basal cisterns are patent.  Visualized paranasal sinuses and mastoid air cells are clear. The calvarium is intact.       No acute intracranial finding.   Signed by: Tod Velasquez 3/12/2024 5:17 PM Dictation workstation:   [id number]    Scheduled medications  vancomycin, 2 g, intravenous, Once      Continuous medications     PRN medications        53-year-old male with no known medical history presenting with altered mental status and headache with concern for aseptic meningitis.  During workup was found to have hyponatremia, proteinuria, hematuria.  Admitted for further workup and monitoring.     Assessment/Plan   Principal Problem:    Headache      Metabolic encephalopathy 2/2 ?aseptic meningitis vs hyponatremia  Headache  Hyponatremia  Hypochloremia  Transaminitis  - onset 3 days ago, etiology likely due to hyponatremia, possibly due to viral illness  - Hyponatremic on admission, serum sodium 125, likely in the setting of poor p.o. intake  - febrile on admission  - CT without acute intracranial abnormality. Defer MRI brain w/wo pending neurology recommendations  - Patient is not hypoglycemic  - TSH WNL  - UDS positive for amphetamine and opiates, he is on vyvanse at home, and received opiates for pain control  - urine sodium/urine cr, urine/serum osm pending  - will correct underlying hyponatremia and assess further if patient clinical condition does not improve.   - CSF studies consistent with normal studies, will discontinue meningitis coverage.    Leukopenia  Thrombocytopenia  - etiology possibly due to active bone marrow suppression in setting of acute illness, possibly 2/2 etoh use  - repeat platelet count in citrate covered tube as this appears to be new onset.   - UPEP/SPEP pending  - LDH elevated  - folate/B12 pending  - HIV pending  - ETOH negative  - daily CBC      New onset proteinuria/hematuria  - unclear etiology  - spot protein/cr ratio 0.86 mg/mg creatinine,  - ROLLY pending  - C3/C4, PLA2R pending         DIET:  regular  DVT  PPX: lovenox  ABX: acyclovir  CODE STATUS: full code   Disposition: >2 midnights      To be discussed with attending physician,  John Lane D.O.  PGY-1 Internal medicine resident    ---------    I saw this patient with the above intern and performed my own History and Physical Examination.   My Subjective and Objective findings are reflected in the above documentation.  The Assessment and Plan reflect my input. My Edits are included in the above documentation.    Surya Dos Santos, DO  Internal Medicine PGY-3

## 2024-03-13 NOTE — PROGRESS NOTES
Spiritual Care Visit    Clinical Encounter Type  Visited With: Patient and family together  Routine Visit: Introduction  Continue Visiting: No                                            Taxonomy  Intended Effects: Promote sense of peace, Helping someone feel comforted, Olive affirmation, Preserve dignity and respect  Methods: Offer spiritual/Restorationist support  Interventions: Share words of hope and inspiration, [place]    Patient was with his mom and sister.  Patients mom is [facility] and praying for her son was very important for her.   spent time listening to the patients story and being a supportive presence and praying.

## 2024-03-13 NOTE — CONSULTS
Consults    Reason For Consult  Concern for aseptic meningitis vs abscess    History Of Present Illness  Josue Quintanilla is a 53 y.o. male presenting with altered mental status noticed by his wife while at home. Starting on Friday, 3/8, he developed a severe headache, described as shooting pains, for which his PCP prescribed Ibuprofen without relief. The headache continued to progress throughout the weekend and on Tuesday, 3/12, he started to act abnormal and was brought to the ED. Since the admission, headache has improved slightly. He also has enlarged lymph nodes. Denies any rash, numbness/tingling. Has a history of Lyme disease in October 2023, but did not complete the course of antibiotics because of a constant upset stomach    CT head without contrast showing no acute intracranial findings, MRI brain showing no evidence of intracranial abscess.  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    Surgical History  He has no past surgical history on file.     Social History  He reports that he has quit smoking. His smoking use included cigarettes. His smokeless tobacco use includes chew. He reports that he does not currently use alcohol. He reports that he does not currently use drugs.    Family History  Family History   Problem Relation Name Age of Onset    Hypertension Mother       Review of Systems  ROS negative unless stated otherwise in HPI    Physical Exam  Constitutional:       Appearance: Normal appearance.   HENT:      Head: Normocephalic and atraumatic.      Mouth/Throat:      Comments: Thrush in posterior oropharynx. Bite geo on inner cheek. Cervical LAD  Eyes:      Conjunctiva/sclera: Conjunctivae normal.   Cardiovascular:      Rate and Rhythm: Normal rate and regular rhythm.      Pulses: Normal pulses.      Heart sounds: Normal heart sounds.   Pulmonary:       Effort: Pulmonary effort is normal.      Breath sounds: Normal breath sounds.   Abdominal:      General: Bowel sounds are normal.      Palpations: Abdomen is soft.   Musculoskeletal:      Cervical back: Neck supple.   Skin:     General: Skin is warm and dry.   Neurological:      Mental Status: He is alert and oriented to person, place, and time.   Psychiatric:         Mood and Affect: Mood normal.       Last Recorded Vitals  /74 (BP Location: Left arm, Patient Position: Lying)   Pulse 63   Temp 36.9 °C (98.4 °F) (Temporal)   Resp 16   Wt 92.8 kg (204 lb 9.4 oz)   SpO2 98%     Relevant Results  Results for orders placed or performed during the hospital encounter of 03/12/24 (from the past 24 hour(s))   CSF Cell Count   Result Value Ref Range    Tube Number, CSF Tube 4       Color, CSF Colorless Colorless    Clarity, CSF Clear Clear    Color, Supernatant CSF Colorless      WBC, CSF 1 1 - 5 /uL    RBC, CSF <1,000 (H) 0 - 5 /uL   CSF Differential   Result Value Ref Range    Neutrophils %, Manual, CSF 0 0 - 5 %    Lymphocytes %, Manual,  (H) 28 - 96 %    Mono/Macrophages %, Manual, CSF 0 (L) 16 - 56 %    Eosinophils %, Manual, CSF 0 Rare %    Basophils %, Manual, CSF 0 Not Established %    Immature Granulocytes %, Manual, CSF 0 Not Established %    Blasts %, Manual, CSF 0 Not Established %    Unclassified Cells %, Manual, CSF 0 Not Established %    Plasma Cells %, Manual, CSF 0 Not Established %    Total Cells Counted,     Glucose, CSF   Result Value Ref Range    Glucose, CSF 69 40 - 70 mg/dL   Protein, Total CSF   Result Value Ref Range    Total Protein, CSF 55 (H) 15 - 45 mg/dL   Lactate Dehydrogenase, CSF   Result Value Ref Range    LD, CSF <25 Not established U/L   Lactate   Result Value Ref Range    Lactate 0.8 0.4 - 2.0 mmol/L   Blood Culture    Specimen: Peripheral Venipuncture; Blood culture   Result Value Ref Range    Blood Culture Loaded on Instrument - Culture in progress    Blood  Culture    Specimen: Peripheral Venipuncture; Blood culture   Result Value Ref Range    Blood Culture Loaded on Instrument - Culture in progress    Reticulocytes   Result Value Ref Range    Retic % 0.3 (L) 0.5 - 2.0 %    Retic Absolute 0.016 (L) 0.022 - 0.118 x10*6/uL    Reticulocyte Hemoglobin 32 28 - 38 pg    Immature Retic fraction 1.0 <=16.0 %   Sedimentation rate, automated   Result Value Ref Range    Sedimentation Rate 6 0 - 20 mm/h   HIV 1/2 Antigen/Antibody Screen with Reflex to Confirmation   Result Value Ref Range    HIV 1/2 Antigen/Antibody Screen with Reflex to Confirmation Initially Reactive (A) Nonreactive   Urinalysis with Reflex Culture and Microscopic   Result Value Ref Range    Color, Urine Yellow Straw, Yellow    Appearance, Urine Clear Clear    Specific Gravity, Urine 1.025 1.005 - 1.035    pH, Urine 5.0 5.0, 5.5, 6.0, 6.5, 7.0, 7.5, 8.0    Protein, Urine >=500 (3+) (N) NEGATIVE mg/dL    Glucose, Urine NEGATIVE NEGATIVE mg/dL    Blood, Urine SMALL (1+) (A) NEGATIVE    Ketones, Urine NEGATIVE NEGATIVE mg/dL    Bilirubin, Urine NEGATIVE NEGATIVE    Urobilinogen, Urine <2.0 <2.0 mg/dL    Nitrite, Urine NEGATIVE NEGATIVE    Leukocyte Esterase, Urine NEGATIVE NEGATIVE   Extra Urine Gray Tube   Result Value Ref Range    Extra Tube Hold for add-ons.    Urinalysis Microscopic   Result Value Ref Range    WBC, Urine 1-5 1-5, NONE /HPF    RBC, Urine NONE NONE, 1-2, 3-5 /HPF    Hyaline Casts, Urine OCCASIONAL (A) NONE /LPF   Protein, Urine Random   Result Value Ref Range    Total Protein, Urine Random 190 (H) 5 - 25 mg/dL    Creatinine, Urine Random 221.6 20.0 - 370.0 mg/dL    T. Protein/Creatinine Ratio 0.86 (H) 0.00 - 0.17 mg/mg Creat   Protein, Urine Random   Result Value Ref Range    Total Protein, Urine Random 207 (H) 5 - 25 mg/dL   Drug Screen, Urine   Result Value Ref Range    Amphetamine Screen, Urine Presumptive Positive (A) Presumptive Negative    Barbiturate Screen, Urine Presumptive Negative  Presumptive Negative    Benzodiazepines Screen, Urine Presumptive Negative Presumptive Negative    Cannabinoid Screen, Urine Presumptive Negative Presumptive Negative    Cocaine Metabolite Screen, Urine Presumptive Negative Presumptive Negative    Fentanyl Screen, Urine Presumptive Negative Presumptive Negative    Opiate Screen, Urine Presumptive Positive (A) Presumptive Negative    Oxycodone Screen, Urine Presumptive Negative Presumptive Negative    PCP Screen, Urine Presumptive Negative Presumptive Negative    Methadone Screen, Urine Presumptive Negative Presumptive Negative   Sodium, Urine Random   Result Value Ref Range    Sodium, Urine Random 36 mmol/L    Creatinine, Urine Random 221.6 20.0 - 370.0 mg/dL    Sodium/Creatinine Ratio 16 Not established. mmol/g Creat   Osmolality, Urine   Result Value Ref Range    Osmolality, Urine Random 771 200 - 1,200 mOsm/kg   Legionella Antigen, Urine    Specimen: Urine   Result Value Ref Range    L. pneumophila Urine Ag Negative Negative   Procalcitonin   Result Value Ref Range    Procalcitonin 0.07 <=0.07 ng/mL   Protein, Total   Result Value Ref Range    Total Protein 5.3 (L) 6.4 - 8.2 g/dL   Folate   Result Value Ref Range    Folate, Serum 14.4 >5.0 ng/mL   Vitamin B12   Result Value Ref Range    Vitamin B12 845 211 - 911 pg/mL   Platelet count   Result Value Ref Range    Platelets 73 (L) 150 - 450 x10*3/uL   C3 Complement   Result Value Ref Range    C3 Complement 76 (L) 87 - 200 mg/dL   C4 Complement   Result Value Ref Range    C4 Complement 34 10 - 50 mg/dL   Hepatitis panel, acute   Result Value Ref Range    Hepatitis B Surface AG Nonreactive Nonreactive    Hepatitis A  AB- IgM Nonreactive Nonreactive    Hepatitis B Core AB; IgM Nonreactive Nonreactive    Hepatitis C AB Nonreactive Nonreactive   Coagulation Screen   Result Value Ref Range    Protime 15.3 (H) 9.8 - 12.8 seconds    INR 1.4 (H) 0.9 - 1.1    aPTT 41 (H) 27 - 38 seconds   Magnesium   Result Value Ref Range     Magnesium 1.66 1.60 - 2.40 mg/dL   Renal function panel   Result Value Ref Range    Glucose 110 (H) 74 - 99 mg/dL    Sodium 128 (L) 136 - 145 mmol/L    Potassium 3.6 3.5 - 5.3 mmol/L    Chloride 96 (L) 98 - 107 mmol/L    Bicarbonate 25 21 - 32 mmol/L    Anion Gap 11 10 - 20 mmol/L    Urea Nitrogen 15 6 - 23 mg/dL    Creatinine 1.03 0.50 - 1.30 mg/dL    eGFR 88 >60 mL/min/1.73m*2    Calcium 7.1 (L) 8.6 - 10.3 mg/dL    Phosphorus 2.8 2.5 - 4.9 mg/dL    Albumin 3.3 (L) 3.4 - 5.0 g/dL   Lavender Top   Result Value Ref Range    Extra Tube Hold for add-ons.    POCT GLUCOSE   Result Value Ref Range    POCT Glucose 98 74 - 99 mg/dL   Drug Screen, Urine   Result Value Ref Range    Amphetamine Screen, Urine Presumptive Negative Presumptive Negative    Barbiturate Screen, Urine Presumptive Negative Presumptive Negative    Benzodiazepines Screen, Urine Presumptive Negative Presumptive Negative    Cannabinoid Screen, Urine Presumptive Negative Presumptive Negative    Cocaine Metabolite Screen, Urine Presumptive Negative Presumptive Negative    Fentanyl Screen, Urine Presumptive Negative Presumptive Negative    Opiate Screen, Urine Presumptive Negative Presumptive Negative    Oxycodone Screen, Urine Presumptive Negative Presumptive Negative    PCP Screen, Urine Presumptive Negative Presumptive Negative    Methadone Screen, Urine Presumptive Negative Presumptive Negative   SST TOP   Result Value Ref Range    Extra Tube Hold for add-ons.      MR brain w and wo IV contrast    Result Date: 3/13/2024  Interpreted By:  Anjelica Fountain and Muddasani Teddy STUDY: MR BRAIN W AND WO IV CONTRAST; 3/13/2024 11:59 am   INDICATION: Signs/Symptoms:r/o abscess.   COMPARISON: CT of the head 03/12/2024.   ACCESSION NUMBER(S): [id number]   ORDERING CLINICIAN: TYRA LARSON   TECHNIQUE: Axial T2, FLAIR, DWI, gradient echo T2 and T1 weighted images of the brain were acquired. Postcontrast T1 weighted images were acquired after administration of  19 mL Dotarem gadolinium based intravenous contrast.   FINDINGS: Images are somewhat degraded by motion.   CSF Spaces: Ventricles, sulci and basal cisterns are within normal limits. No abnormal extra-axial collection.   Parenchyma: No restricted diffusion to suggest acute infarction. A minimal burden of nonspecific T2 and FLAIR hyperintense signal is scattered throughout the supratentorial white matter. T2 hyperintense regions within the inferior basal ganglia are favored to represent prominent perivascular spaces. No evidence of intracranial hemorrhage. Slight bowing of the septum pellucidum to the left is within normal limits. No mass effect or midline shift. No abnormal enhancement.   Paranasal Sinuses and Mastoids: Visualized paranasal sinuses and mastoid air cells are clear.   The major intracranial flow voids are patent. No abnormal filling defect is identified within the dural venous sinuses.       No evidence of intracranial abscess.   Minimal white-matter changes are entirely non-specific and may be seen with migraine headaches, chronic hypertension, small-vessel ischemic disease, among others. No evidence of acute ischemic injury or intracranial mass.   I personally reviewed the images/study and I agree with the findings as stated by Dr. Yarbrough.   MACRO: None   Signed by: Anjelica Fountain 3/13/2024 12:56 PM Dictation workstation:   [id number]    CT abdomen pelvis w IV contrast    Result Date: 3/13/2024  Interpreted By:  Jenaro Jang, STUDY: CT ABDOMEN PELVIS W IV CONTRAST;  3/13/2024 11:41 am   INDICATION: Signs/Symptoms:r/o abscess.   COMPARISON: None.   ACCESSION NUMBER(S): [id number]   ORDERING CLINICIAN: TYRA LARSON   TECHNIQUE: CT of the abdomen and pelvis was performed.  Contiguous axial images were obtained at 3 mm slice thickness through the abdomen and pelvis. Coronal and sagittal reconstructions at 3 mm slice thickness were performed. 75 mL Omnipaque 350 administered intravenously without  immediate complication.   FINDINGS: LOWER CHEST: Bibasilar atelectasis.   ABDOMEN:   LIVER: Unremarkable   BILE DUCTS: Not dilated.   GALLBLADDER: No calcified stone or definite inflammation.   PANCREAS: Unremarkable   SPLEEN: Enlarged measuring 14.4 cm in length.   ADRENAL GLANDS: Unremarkable   KIDNEYS AND URETERS: Small cyst in the mid right kidney. Otherwise unremarkable kidneys without hydronephrosis.   PELVIS:   BLADDER: Nondistended.   REPRODUCTIVE ORGANS: No visualized acute abnormalities.   BOWEL: Perirectal stranding is noted. There may be some rectal wall thickening as well as adjacent prominent lymph nodes measuring up to 9 mm short axis. No findings of bowel obstruction otherwise. Unremarkable appendix. Unremarkable mesentery.     VESSELS: Aortoiliac system is patent without aneurysm.  Major visceral branches are patent. IVC and major branches are grossly patent. Major portal venous branches are patent.   PERITONEUM AND RETROPERITONEUM: No free fluid or free air.   Prominent perirectal lymph nodes. No abdominal lymphadenopathy.   BONE AND ABDOMINAL WALL: No acute skeletal findings.   Degenerative changes of the spine.         Mild rectal wall thickening with adjacent fat stranding and prominent lymph nodes suggestive of proctitis although underlying mass should be excluded. Suggest clinical correlation and nonemergent direct visualization to exclude neoplasm. No abscess otherwise identified.   MACRO: None.   Signed by: Jenaro Jang 3/13/2024 12:14 PM Dictation workstation:   [id number]    XR chest 1 view    Result Date: 3/12/2024  Interpreted By:  Tomer Giron, STUDY: XR CHEST 1 VIEW;  3/12/2024 6:48 pm   INDICATION: Signs/Symptoms:hyponatremia, rule out pneumonia.   COMPARISON: None.   ACCESSION NUMBER(S): [id number]   ORDERING CLINICIAN: BEE SCHUSTER   FINDINGS:     The cardiomediastinal silhouette and pulmonary vasculature are within normal limits. No consolidation, pleural effusion or  pneumothorax.         No acute cardiopulmonary process.     MACRO: None.   Signed by: Tomer Giron 3/12/2024 6:51 PM Dictation workstation:   [id number]    CT head wo IV contrast    Result Date: 3/12/2024  Interpreted By:  Tod Velasquez, STUDY: CT HEAD WO IV CONTRAST;  3/12/2024 5:07 pm   INDICATION: Signs/Symptoms:headache.   COMPARISON: 01/22/2017   ACCESSION NUMBER(S): [id number]   ORDERING CLINICIAN: BEE SCHUSTER   TECHNIQUE: Noncontrast axial CT scan of head was performed. Angled reformats in brain and bone windows were generated. The images were reviewed in bone, brain, blood and soft tissue windows.   FINDINGS: There is no intra/extra-axial fluid collection, mass effect, or midline shift. The gray/white matter junction is preserved. The basal cisterns are patent. Visualized paranasal sinuses and mastoid air cells are clear. The calvarium is intact.       No acute intracranial finding.   Signed by: Tod Velasquez 3/12/2024 5:17 PM Dictation workstation:   [id number]      Assessment/Plan     53 year old male presented with severe headache and increased confusion with concern for acute HIV infection    1) Acute HIV infection  2) Headache  3) Leukopenia  4) Thrombocytopenia    Plan:    1) HIV labs as per Infectious Disease  2) Headache most likely related to acute HIV infection    Neurology will sign off. Please do not hesitate to reach out with questions    Staffed with Dr. Patel,   Lissette Burch MD  Internal Medicine, PGY-3

## 2024-03-13 NOTE — PROGRESS NOTES
Josue Quintanilla is a 53 y.o. male on day 1 of admission presenting with Headache.    Subjective   No acute overnight events. Patient seen and evaluated at bedside. Headache has improved since arrival, but has not fully resolved. Denies any chest pain, shortness of breath, fevers, chills, abdominal pain, diarrhea, rash, swelling, or change in vision. He notes some neck stiffness, but is able to move his neck in all directions without significant difficulty.    Objective     Last Recorded Vitals  /73 (BP Location: Right leg, Patient Position: Lying)   Pulse 60   Temp 35.7 °C (96.3 °F) (Temporal)   Resp 18   Wt 92.8 kg (204 lb 9.4 oz)   SpO2 100%   Intake/Output last 3 Shifts:    Intake/Output Summary (Last 24 hours) at 3/13/2024 1145  Last data filed at 3/13/2024 0742  Gross per 24 hour   Intake 500 ml   Output 750 ml   Net -250 ml       Admission Weight  Weight: 93 kg (205 lb) (03/12/24 1553)    Daily Weight  03/12/24 : 92.8 kg (204 lb 9.4 oz)    Image Results  XR chest 1 view  Narrative: Interpreted By:  Tomer Giron,   STUDY:  XR CHEST 1 VIEW;  3/12/2024 6:48 pm      INDICATION:  Signs/Symptoms:hyponatremia, rule out pneumonia.      COMPARISON:  None.      ACCESSION NUMBER(S):  [id number]      ORDERING CLINICIAN:  BEE SCHUSTER      FINDINGS:          The cardiomediastinal silhouette and pulmonary vasculature are within  normal limits. No consolidation, pleural effusion or pneumothorax.          Impression: No acute cardiopulmonary process.          MACRO:  None.      Signed by: Tomer Giron 3/12/2024 6:51 PM  Dictation workstation:   [id number]  CT head wo IV contrast  Narrative: Interpreted By:  Tod Velasquez,   STUDY:  CT HEAD WO IV CONTRAST;  3/12/2024 5:07 pm      INDICATION:  Signs/Symptoms:headache.      COMPARISON:  01/22/2017      ACCESSION NUMBER(S):  [id number]      ORDERING CLINICIAN:  BEE SCHUSTER      TECHNIQUE:  Noncontrast axial CT scan of head was performed. Angled reformats  in  brain and bone windows were generated. The images were reviewed in  bone, brain, blood and soft tissue windows.      FINDINGS:  There is no intra/extra-axial fluid collection, mass effect, or  midline shift. The gray/white matter junction is preserved. The basal  cisterns are patent. Visualized paranasal sinuses and mastoid air  cells are clear. The calvarium is intact.      Impression: No acute intracranial finding.      Signed by: Tod Velasquez 3/12/2024 5:17 PM  Dictation workstation:   [id number]    Physical Exam  Constitutional:       General: He is not in acute distress.     Appearance: Normal appearance. He is not toxic-appearing.   HENT:      Head: Normocephalic and atraumatic.      Mouth/Throat:      Mouth: Mucous membranes are moist.      Pharynx: No posterior oropharyngeal erythema.   Eyes:      General: No scleral icterus.     Extraocular Movements: Extraocular movements intact.   Cardiovascular:      Rate and Rhythm: Normal rate and regular rhythm.      Heart sounds: No murmur heard.     No friction rub. No gallop.   Pulmonary:      Effort: Pulmonary effort is normal. No respiratory distress.      Breath sounds: No wheezing, rhonchi or rales.   Abdominal:      General: Abdomen is flat. There is no distension.      Palpations: Abdomen is soft.      Tenderness: There is no abdominal tenderness. There is no guarding.   Musculoskeletal:         General: No swelling or tenderness.      Cervical back: No rigidity or tenderness.   Skin:     General: Skin is warm and dry.   Neurological:      General: No focal deficit present.      Mental Status: He is alert and oriented to person, place, and time.      Motor: No weakness.     Relevant Results  Scheduled medications  enoxaparin, 40 mg, subcutaneous, Daily  gadoterate meglumine, 19 mL, intravenous, Once in imaging  polyethylene glycol, 17 g, oral, Daily    Continuous medications  sodium chloride 0.9%, 125 mL/hr, Last Rate: 125 mL/hr (03/13/24 0947)    PRN  medications  PRN medications: ondansetron, oxyCODONE, oxyCODONE, oxygen  Results for orders placed or performed during the hospital encounter of 03/12/24 (from the past 24 hour(s))   CBC and Auto Differential   Result Value Ref Range    WBC 2.3 (L) 4.4 - 11.3 x10*3/uL    nRBC 0.0 0.0 - 0.0 /100 WBCs    RBC 5.13 4.50 - 5.90 x10*6/uL    Hemoglobin 14.5 13.5 - 17.5 g/dL    Hematocrit 41.2 41.0 - 52.0 %    MCV 80 80 - 100 fL    MCH 28.3 26.0 - 34.0 pg    MCHC 35.2 32.0 - 36.0 g/dL    RDW 12.4 11.5 - 14.5 %    Platelets 85 (L) 150 - 450 x10*3/uL    Neutrophils % 50.7 40.0 - 80.0 %    Immature Granulocytes %, Automated 0.0 0.0 - 0.9 %    Lymphocytes % 44.0 13.0 - 44.0 %    Monocytes % 4.9 2.0 - 10.0 %    Eosinophils % 0.0 0.0 - 6.0 %    Basophils % 0.4 0.0 - 2.0 %    Neutrophils Absolute 1.14 (L) 1.20 - 7.70 x10*3/uL    Immature Granulocytes Absolute, Automated 0.00 0.00 - 0.70 x10*3/uL    Lymphocytes Absolute 0.99 (L) 1.20 - 4.80 x10*3/uL    Monocytes Absolute 0.11 0.10 - 1.00 x10*3/uL    Eosinophils Absolute 0.00 0.00 - 0.70 x10*3/uL    Basophils Absolute 0.01 0.00 - 0.10 x10*3/uL   Comprehensive metabolic panel   Result Value Ref Range    Glucose 100 (H) 74 - 99 mg/dL    Sodium 125 (L) 136 - 145 mmol/L    Potassium 3.8 3.5 - 5.3 mmol/L    Chloride 90 (L) 98 - 107 mmol/L    Bicarbonate 31 21 - 32 mmol/L    Anion Gap 8 (L) 10 - 20 mmol/L    Urea Nitrogen 14 6 - 23 mg/dL    Creatinine 1.28 0.50 - 1.30 mg/dL    eGFR 68 >60 mL/min/1.73m*2    Calcium 8.5 (L) 8.6 - 10.3 mg/dL    Albumin 4.1 3.4 - 5.0 g/dL    Alkaline Phosphatase 38 33 - 120 U/L    Total Protein 6.6 6.4 - 8.2 g/dL    AST 74 (H) 9 - 39 U/L    Bilirubin, Total 0.5 0.0 - 1.2 mg/dL    ALT 43 10 - 52 U/L   Sars-CoV-2 and Influenza A/B PCR   Result Value Ref Range    Flu A Result Not Detected Not Detected    Flu B Result Not Detected Not Detected    Coronavirus 2019, PCR Not Detected Not Detected   Light Blue Top   Result Value Ref Range    Extra Tube Hold for  add-ons.    PST Top   Result Value Ref Range    Extra Tube Hold for add-ons.    CSF Culture/Smear    Specimen: Lumbar Puncture; Cerebrospinal Fluid   Result Value Ref Range    CSF Culture/Smear No growth to date     Gram Stain No polymorphonuclear leukocytes seen     Gram Stain No organisms seen    Cryptococcal Antigen, CSF/Serum    Specimen: Lumbar Puncture; Cerebrospinal Fluid   Result Value Ref Range    Cryptococcal AG, Qual Negative Negative, Invalid   HSV PCR, CSF    Specimen: Lumbar Puncture; Cerebrospinal Fluid   Result Value Ref Range    Herpes simplex virus Type 1 PCR, Qual, CSF Not Detected Not Detected    HSV 2 PCR, QuaL, CSF Not Detected Not Detected   Meningitis Pathogen Panel, PCR    Specimen: Lumbar Puncture; Cerebrospinal Fluid   Result Value Ref Range    Color, CSF Colorless     Escherichia coli K1, CSF, PCR Not Detected Not Detected    Haemophilus influenzae, CSF, PCR Not Detected Not Detected    Listeria monocytogenes, CSF, PCR Not Detected Not Detected    Neisseria meningitidis, CSF, PCR Not Detected Not Detected    Strep. agalactiae, CSF, PCR Not Detected Not Detected    Strep.pneumoniae, CSF, PCR Not Detected Not Detected    Cytomegalovirus, CSF, PCR Not Detected Not Detected    Enterovirus, CSF, PCR Not Detected Not Detected    Human Herpesvirus 6, CSF, PCR Not Detected Not Detected    Herpes Simplex 1, CSF, PCR Not Detected Not Detected    Herpes Simplex 2, CSF, PCR Not Detected Not Detected    Human Parechovirus, CSF, PCR Not Detected Not Detected    Varicella Zoster CSF PCR Not Detected Not Detected    Cryptococcus, CSF, PCR Not Detected Not Detected   Mononucleosis screen   Result Value Ref Range    Mononucleosis Screen Negative Negative   C-reactive protein   Result Value Ref Range    C-Reactive Protein <0.10 <1.00 mg/dL   TSH with reflex to Free T4 if abnormal   Result Value Ref Range    Thyroid Stimulating Hormone 2.51 0.44 - 3.98 mIU/L   Lactate dehydrogenase   Result Value Ref Range      (H) 84 - 246 U/L   Lipid Panel   Result Value Ref Range    Cholesterol 112 0 - 199 mg/dL    HDL-Cholesterol 16.4 mg/dL    Cholesterol/HDL Ratio 6.8     LDL Calculated 61 <=99 mg/dL    VLDL 35 0 - 40 mg/dL    Triglycerides 175 (H) 0 - 149 mg/dL    Non HDL Cholesterol 96 0 - 149 mg/dL   Ethanol   Result Value Ref Range    Alcohol <10 <=10 mg/dL   Osmolality   Result Value Ref Range    Osmolality, Serum 267 (L) 280 - 300 mOsm/kg   CSF Cell Count   Result Value Ref Range    Tube Number, CSF Tube 4       Color, CSF Colorless Colorless    Clarity, CSF Clear Clear    Color, Supernatant CSF Colorless      WBC, CSF 1 1 - 5 /uL    RBC, CSF <1,000 (H) 0 - 5 /uL   CSF Differential   Result Value Ref Range    Neutrophils %, Manual, CSF 0 0 - 5 %    Lymphocytes %, Manual,  (H) 28 - 96 %    Mono/Macrophages %, Manual, CSF 0 (L) 16 - 56 %    Eosinophils %, Manual, CSF 0 Rare %    Basophils %, Manual, CSF 0 Not Established %    Immature Granulocytes %, Manual, CSF 0 Not Established %    Blasts %, Manual, CSF 0 Not Established %    Unclassified Cells %, Manual, CSF 0 Not Established %    Plasma Cells %, Manual, CSF 0 Not Established %    Total Cells Counted,     Glucose, CSF   Result Value Ref Range    Glucose, CSF 69 40 - 70 mg/dL   Protein, Total CSF   Result Value Ref Range    Total Protein, CSF 55 (H) 15 - 45 mg/dL   Lactate Dehydrogenase, CSF   Result Value Ref Range    LD, CSF <25 Not established U/L   Lactate   Result Value Ref Range    Lactate 0.8 0.4 - 2.0 mmol/L   Blood Culture    Specimen: Peripheral Venipuncture; Blood culture   Result Value Ref Range    Blood Culture Loaded on Instrument - Culture in progress    Blood Culture    Specimen: Peripheral Venipuncture; Blood culture   Result Value Ref Range    Blood Culture Loaded on Instrument - Culture in progress    Reticulocytes   Result Value Ref Range    Retic % 0.3 (L) 0.5 - 2.0 %    Retic Absolute 0.016 (L) 0.022 - 0.118 x10*6/uL     Reticulocyte Hemoglobin 32 28 - 38 pg    Immature Retic fraction 1.0 <=16.0 %   Sedimentation rate, automated   Result Value Ref Range    Sedimentation Rate 6 0 - 20 mm/h   Urinalysis with Reflex Culture and Microscopic   Result Value Ref Range    Color, Urine Yellow Straw, Yellow    Appearance, Urine Clear Clear    Specific Gravity, Urine 1.025 1.005 - 1.035    pH, Urine 5.0 5.0, 5.5, 6.0, 6.5, 7.0, 7.5, 8.0    Protein, Urine >=500 (3+) (N) NEGATIVE mg/dL    Glucose, Urine NEGATIVE NEGATIVE mg/dL    Blood, Urine SMALL (1+) (A) NEGATIVE    Ketones, Urine NEGATIVE NEGATIVE mg/dL    Bilirubin, Urine NEGATIVE NEGATIVE    Urobilinogen, Urine <2.0 <2.0 mg/dL    Nitrite, Urine NEGATIVE NEGATIVE    Leukocyte Esterase, Urine NEGATIVE NEGATIVE   Extra Urine Gray Tube   Result Value Ref Range    Extra Tube Hold for add-ons.    Urinalysis Microscopic   Result Value Ref Range    WBC, Urine 1-5 1-5, NONE /HPF    RBC, Urine NONE NONE, 1-2, 3-5 /HPF    Hyaline Casts, Urine OCCASIONAL (A) NONE /LPF   Protein, Urine Random   Result Value Ref Range    Total Protein, Urine Random 190 (H) 5 - 25 mg/dL    Creatinine, Urine Random 221.6 20.0 - 370.0 mg/dL    T. Protein/Creatinine Ratio 0.86 (H) 0.00 - 0.17 mg/mg Creat   Protein, Urine Random   Result Value Ref Range    Total Protein, Urine Random 207 (H) 5 - 25 mg/dL   Drug Screen, Urine   Result Value Ref Range    Amphetamine Screen, Urine Presumptive Positive (A) Presumptive Negative    Barbiturate Screen, Urine Presumptive Negative Presumptive Negative    Benzodiazepines Screen, Urine Presumptive Negative Presumptive Negative    Cannabinoid Screen, Urine Presumptive Negative Presumptive Negative    Cocaine Metabolite Screen, Urine Presumptive Negative Presumptive Negative    Fentanyl Screen, Urine Presumptive Negative Presumptive Negative    Opiate Screen, Urine Presumptive Positive (A) Presumptive Negative    Oxycodone Screen, Urine Presumptive Negative Presumptive Negative    PCP  Screen, Urine Presumptive Negative Presumptive Negative    Methadone Screen, Urine Presumptive Negative Presumptive Negative   Sodium, Urine Random   Result Value Ref Range    Sodium, Urine Random 36 mmol/L    Creatinine, Urine Random 221.6 20.0 - 370.0 mg/dL    Sodium/Creatinine Ratio 16 Not established. mmol/g Creat   Osmolality, Urine   Result Value Ref Range    Osmolality, Urine Random 771 200 - 1,200 mOsm/kg   Legionella Antigen, Urine    Specimen: Urine   Result Value Ref Range    L. pneumophila Urine Ag Negative Negative   Protein, Total   Result Value Ref Range    Total Protein 5.3 (L) 6.4 - 8.2 g/dL   Folate   Result Value Ref Range    Folate, Serum 14.4 >5.0 ng/mL   Vitamin B12   Result Value Ref Range    Vitamin B12 845 211 - 911 pg/mL   Platelet count   Result Value Ref Range    Platelets 73 (L) 150 - 450 x10*3/uL   C3 Complement   Result Value Ref Range    C3 Complement 76 (L) 87 - 200 mg/dL   C4 Complement   Result Value Ref Range    C4 Complement 34 10 - 50 mg/dL   Coagulation Screen   Result Value Ref Range    Protime 15.3 (H) 9.8 - 12.8 seconds    INR 1.4 (H) 0.9 - 1.1    aPTT 41 (H) 27 - 38 seconds   Magnesium   Result Value Ref Range    Magnesium 1.66 1.60 - 2.40 mg/dL   Renal function panel   Result Value Ref Range    Glucose 110 (H) 74 - 99 mg/dL    Sodium 128 (L) 136 - 145 mmol/L    Potassium 3.6 3.5 - 5.3 mmol/L    Chloride 96 (L) 98 - 107 mmol/L    Bicarbonate 25 21 - 32 mmol/L    Anion Gap 11 10 - 20 mmol/L    Urea Nitrogen 15 6 - 23 mg/dL    Creatinine 1.03 0.50 - 1.30 mg/dL    eGFR 88 >60 mL/min/1.73m*2    Calcium 7.1 (L) 8.6 - 10.3 mg/dL    Phosphorus 2.8 2.5 - 4.9 mg/dL    Albumin 3.3 (L) 3.4 - 5.0 g/dL   Lavender Top   Result Value Ref Range    Extra Tube Hold for add-ons.    POCT GLUCOSE   Result Value Ref Range    POCT Glucose 98 74 - 99 mg/dL     XR chest 1 view    Result Date: 3/12/2024  Interpreted By:  Tomer Giron, STUDY: XR CHEST 1 VIEW;  3/12/2024 6:48 pm   INDICATION:  Signs/Symptoms:hyponatremia, rule out pneumonia.   COMPARISON: None.   ACCESSION NUMBER(S): [id number]   ORDERING CLINICIAN: BEE SCHUSTER   FINDINGS:     The cardiomediastinal silhouette and pulmonary vasculature are within normal limits. No consolidation, pleural effusion or pneumothorax.         No acute cardiopulmonary process.     MACRO: None.   Signed by: Tomer Giron 3/12/2024 6:51 PM Dictation workstation:   [id number]    CT head wo IV contrast    Result Date: 3/12/2024  Interpreted By:  Tod Velasquez, STUDY: CT HEAD WO IV CONTRAST;  3/12/2024 5:07 pm   INDICATION: Signs/Symptoms:headache.   COMPARISON: 01/22/2017   ACCESSION NUMBER(S): [id number]   ORDERING CLINICIAN: BEE SCHUSTER   TECHNIQUE: Noncontrast axial CT scan of head was performed. Angled reformats in brain and bone windows were generated. The images were reviewed in bone, brain, blood and soft tissue windows.   FINDINGS: There is no intra/extra-axial fluid collection, mass effect, or midline shift. The gray/white matter junction is preserved. The basal cisterns are patent. Visualized paranasal sinuses and mastoid air cells are clear. The calvarium is intact.       No acute intracranial finding.   Signed by: Tod Velasquez 3/12/2024 5:17 PM Dictation workstation:   [id number]     Assessment/Plan   Principal Problem:    Headache    53-year-old male with PMHx significant for ADHD on Vyvanse who initially presented with altered mental status and headache. Initial workup was significant for hyponatremia, hypochloremia, leukopenia, and thrombocytopenia. There was concern for aseptic meningitis and LP was performed while in the ED. CSF studies suggestive for aseptic meningitis rather than bacterial or viral meningitis. CSF viral PCR studies negative. UA did show 3+ proteinuria and 1+ hematuria without signs of infections. Differential diagnosis includes metabolic encephalopathy 2/2 aseptic meninigitis vs. Symptomatic hyponatremia vs. New  acute viral infection. ID and neurology were consulted for further guidance.    Metabolic encephalopathy 2/2 ?aseptic meningitis vs hyponatremia  Headache  Hyponatremia  Hypochloremia  Transaminitis  Potentially due to hyponatremia, possibly due to viral illness. Patient was febrile on admission, unclear primary etiology of fever.  - Hyponatremic with sodium 125 on admission, likely in the setting of poor p.o. intake. Encourage PO intake.  - Urine sodium 36, urine creatinine 221.6, FeNa 0.1%  - Urine osmolality 771, serum osmolality 267  - CXR negative for acute process  - CT head without acute intracranial abnormality  - CT abdomen w IV contrast showed mild rectal wall thickening with adjacent fat stranding and prominent lymph nodes suggestive of proctitis, no abscess identified  - MRI w and w/o contrast showed no evidence of intracranial abscess, acute ischemic injury, or intracranial mass.  - TSH WNL  - UDS positive for amphetamine and opiates, however patient is on Vyvanse at home, and received opiates for pain control  - CSF studies consistent with normal studies, no clear indication for continued antibiotics or antiviral coverage.  - EBV, Syphilis, CD4/8, CSF VDRL, and HIV RNA all ordered  - Neurology consulted, appreciate recs  - ID consulted, appreciate recs  - Trend RFP     Leukopenia  Thrombocytopenia  - Platelets on admission 85, repeat platelets 73. Prior platelet count >200 in 11/2023  - UPEP/SPEP pending  - LDH elevated  - folate/B12 both WNL  - HIV initially reactive, reflex confirmation pending  - ETOH negative  - Trend CBC     New onset proteinuria/hematuria  - unclear etiology  - spot protein/cr ratio 0.86 mg/mg creatinine,  - ROLLY pending  - C3 decreased at 76, C4 WNL  - PLA2R pending    DIET:  regular  DVT PPX: lovenox  ABx: None  CODE STATUS: full code    Disposition: Initially unclear etiology, patient will require HIV confirmation and further evaluation prior to discharge. Anticipate 1-2+  additional midnights.    The assessment and plan were discussed with the senior resident and attending physician,  Maico Esparza DO PGY-1

## 2024-03-14 PROBLEM — R50.9 FEVER: Status: RESOLVED | Noted: 2024-03-13 | Resolved: 2024-03-14

## 2024-03-14 PROBLEM — G93.40 ENCEPHALOPATHY: Status: RESOLVED | Noted: 2024-03-13 | Resolved: 2024-03-14

## 2024-03-14 LAB
ALBUMIN SERPL BCP-MCNC: 3.4 G/DL (ref 3.4–5)
ALP SERPL-CCNC: 27 U/L (ref 33–120)
ALT SERPL W P-5'-P-CCNC: 40 U/L (ref 10–52)
ANA SER QL HEP2 SUBST: NEGATIVE
ANION GAP SERPL CALC-SCNC: 8 MMOL/L (ref 10–20)
AST SERPL W P-5'-P-CCNC: 62 U/L (ref 9–39)
BASOPHILS # BLD MANUAL: 0 X10*3/UL (ref 0–0.1)
BASOPHILS NFR BLD MANUAL: 0 %
BILIRUB SERPL-MCNC: 0.5 MG/DL (ref 0–1.2)
BUN SERPL-MCNC: 9 MG/DL (ref 6–23)
C TRACH RRNA SPEC QL NAA+PROBE: NEGATIVE
CALCIUM SERPL-MCNC: 7.7 MG/DL (ref 8.6–10.3)
CD3+CD4+ CELLS # BLD: 0.55 X10E9/L
CD3+CD4+ CELLS # BLD: 545 /MM3
CD3+CD4+ CELLS NFR BLD: 55 %
CD3+CD4+ CELLS/CD3+CD8+ CLL BLD: 1.72 %
CD3+CD8+ CELLS # BLD: 0.32 X10E9/L
CD3+CD8+ CELLS NFR BLD: 32 %
CHLORIDE SERPL-SCNC: 97 MMOL/L (ref 98–107)
CO2 SERPL-SCNC: 31 MMOL/L (ref 21–32)
CREAT SERPL-MCNC: 0.91 MG/DL (ref 0.5–1.3)
EGFRCR SERPLBLD CKD-EPI 2021: >90 ML/MIN/1.73M*2
EOSINOPHIL # BLD MANUAL: 0 X10*3/UL (ref 0–0.7)
EOSINOPHIL NFR BLD MANUAL: 0 %
ERYTHROCYTE [DISTWIDTH] IN BLOOD BY AUTOMATED COUNT: 12.6 % (ref 11.5–14.5)
FLOW CYTOMETRY SPECIALIST REVIEW: ABNORMAL
GIANT PLATELETS BLD QL SMEAR: ABNORMAL
GLUCOSE SERPL-MCNC: 105 MG/DL (ref 74–99)
HCT VFR BLD AUTO: 38 % (ref 41–52)
HGB BLD-MCNC: 12.9 G/DL (ref 13.5–17.5)
HIV 1 & 2 AB SERPL IA.RAPID: NEGATIVE
HIV1 RNA # PLAS NAA DL=20: ABNORMAL {COPIES}/ML
HIV1 RNA SPEC NAA+PROBE-LOG#: ABNORMAL {LOG_COPIES}/ML
IMM GRANULOCYTES # BLD AUTO: 0 X10*3/UL (ref 0–0.7)
IMM GRANULOCYTES NFR BLD AUTO: 0 % (ref 0–0.9)
LYMPHOCYTES # BLD MANUAL: 1.3 X10*3/UL (ref 1.2–4.8)
LYMPHOCYTES # SPEC AUTO: 0.99 X10*3/UL
LYMPHOCYTES NFR BLD MANUAL: 48 %
MAGNESIUM SERPL-MCNC: 2.09 MG/DL (ref 1.6–2.4)
MCH RBC QN AUTO: 27.9 PG (ref 26–34)
MCHC RBC AUTO-ENTMCNC: 33.9 G/DL (ref 32–36)
MCV RBC AUTO: 82 FL (ref 80–100)
MONOCYTES # BLD MANUAL: 0.08 X10*3/UL (ref 0.1–1)
MONOCYTES NFR BLD MANUAL: 3 %
MYELOCYTES # BLD MANUAL: 0.03 X10*3/UL
MYELOCYTES NFR BLD MANUAL: 1 %
N GONORRHOEA DNA SPEC QL PROBE+SIG AMP: NEGATIVE
NEUTROPHILS # BLD MANUAL: 1.29 X10*3/UL (ref 1.2–7.7)
NEUTS BAND # BLD MANUAL: 0.24 X10*3/UL (ref 0–0.7)
NEUTS BAND NFR BLD MANUAL: 9 %
NEUTS SEG # BLD MANUAL: 1.05 X10*3/UL (ref 1.2–7)
NEUTS SEG NFR BLD MANUAL: 39 %
NRBC BLD-RTO: 0 /100 WBCS (ref 0–0)
PLATELET # BLD AUTO: 79 X10*3/UL (ref 150–450)
POTASSIUM SERPL-SCNC: 3.8 MMOL/L (ref 3.5–5.3)
PROT SERPL-MCNC: 5.5 G/DL (ref 6.4–8.2)
RBC # BLD AUTO: 4.62 X10*6/UL (ref 4.5–5.9)
RBC MORPH BLD: ABNORMAL
RPR SER QL: NONREACTIVE
SODIUM SERPL-SCNC: 132 MMOL/L (ref 136–145)
TOTAL CELLS COUNTED BLD: 100
WBC # BLD AUTO: 2.7 X10*3/UL (ref 4.4–11.3)

## 2024-03-14 PROCEDURE — 99223 1ST HOSP IP/OBS HIGH 75: CPT

## 2024-03-14 PROCEDURE — 2500000001 HC RX 250 WO HCPCS SELF ADMINISTERED DRUGS (ALT 637 FOR MEDICARE OP)

## 2024-03-14 PROCEDURE — 85027 COMPLETE CBC AUTOMATED: CPT

## 2024-03-14 PROCEDURE — 80053 COMPREHEN METABOLIC PANEL: CPT

## 2024-03-14 PROCEDURE — 85007 BL SMEAR W/DIFF WBC COUNT: CPT

## 2024-03-14 PROCEDURE — 87900 PHENOTYPE INFECT AGENT DRUG: CPT | Performed by: INTERNAL MEDICINE

## 2024-03-14 PROCEDURE — 2500000001 HC RX 250 WO HCPCS SELF ADMINISTERED DRUGS (ALT 637 FOR MEDICARE OP): Performed by: INTERNAL MEDICINE

## 2024-03-14 PROCEDURE — 36415 COLL VENOUS BLD VENIPUNCTURE: CPT

## 2024-03-14 PROCEDURE — 83735 ASSAY OF MAGNESIUM: CPT

## 2024-03-14 PROCEDURE — 1100000001 HC PRIVATE ROOM DAILY

## 2024-03-14 PROCEDURE — 2500000004 HC RX 250 GENERAL PHARMACY W/ HCPCS (ALT 636 FOR OP/ED)

## 2024-03-14 PROCEDURE — 36415 COLL VENOUS BLD VENIPUNCTURE: CPT | Performed by: STUDENT IN AN ORGANIZED HEALTH CARE EDUCATION/TRAINING PROGRAM

## 2024-03-14 PROCEDURE — 81381 HLA I TYPING 1 ALLELE HR: CPT | Performed by: STUDENT IN AN ORGANIZED HEALTH CARE EDUCATION/TRAINING PROGRAM

## 2024-03-14 RX ADMIN — POLYETHYLENE GLYCOL 3350 17 G: 17 POWDER, FOR SOLUTION ORAL at 08:26

## 2024-03-14 RX ADMIN — BICTEGRAVIR SODIUM, EMTRICITABINE, AND TENOFOVIR ALAFENAMIDE FUMARATE 1 TABLET: 50; 200; 25 TABLET ORAL at 16:23

## 2024-03-14 RX ADMIN — OXYCODONE HYDROCHLORIDE 10 MG: 10 TABLET ORAL at 00:37

## 2024-03-14 RX ADMIN — ENOXAPARIN SODIUM 40 MG: 40 INJECTION SUBCUTANEOUS at 08:26

## 2024-03-14 RX ADMIN — OXYCODONE HYDROCHLORIDE 5 MG: 5 TABLET ORAL at 08:26

## 2024-03-14 ASSESSMENT — PAIN SCALES - GENERAL
PAINLEVEL_OUTOF10: 0 - NO PAIN
PAINLEVEL_OUTOF10: 6
PAINLEVEL_OUTOF10: 3
PAINLEVEL_OUTOF10: 7
PAINLEVEL_OUTOF10: 4
PAINLEVEL_OUTOF10: 3

## 2024-03-14 ASSESSMENT — COGNITIVE AND FUNCTIONAL STATUS - GENERAL
DRESSING REGULAR LOWER BODY CLOTHING: A LITTLE
STANDING UP FROM CHAIR USING ARMS: A LITTLE
HELP NEEDED FOR BATHING: A LITTLE
DAILY ACTIVITIY SCORE: 20
DRESSING REGULAR UPPER BODY CLOTHING: A LITTLE
WALKING IN HOSPITAL ROOM: A LITTLE
MOBILITY SCORE: 21
DRESSING REGULAR LOWER BODY CLOTHING: A LITTLE
MOVING TO AND FROM BED TO CHAIR: A LITTLE
CLIMB 3 TO 5 STEPS WITH RAILING: A LITTLE
STANDING UP FROM CHAIR USING ARMS: A LITTLE
CLIMB 3 TO 5 STEPS WITH RAILING: A LITTLE
TOILETING: A LITTLE
WALKING IN HOSPITAL ROOM: A LITTLE
MOBILITY SCORE: 20
DAILY ACTIVITIY SCORE: 20
DRESSING REGULAR UPPER BODY CLOTHING: A LITTLE
TOILETING: A LITTLE
HELP NEEDED FOR BATHING: A LITTLE

## 2024-03-14 ASSESSMENT — PAIN - FUNCTIONAL ASSESSMENT
PAIN_FUNCTIONAL_ASSESSMENT: 0-10

## 2024-03-14 ASSESSMENT — PAIN DESCRIPTION - LOCATION: LOCATION: HEAD

## 2024-03-14 NOTE — CARE PLAN
The patient's goals for the shift include  remaining comfortable throughout shift.    The clinical goals for the shift include see POC      Problem: Pain  Goal: Takes deep breaths with improved pain control throughout the shift  Outcome: Progressing  Goal: Turns in bed with improved pain control throughout the shift  Outcome: Progressing  Goal: Walks with improved pain control throughout the shift  Outcome: Progressing  Goal: Performs ADL's with improved pain control throughout shift  Outcome: Progressing  Goal: Participates in PT with improved pain control throughout the shift  Outcome: Progressing  Goal: Free from opioid side effects throughout the shift  Outcome: Progressing  Goal: Free from acute confusion related to pain meds throughout the shift  Outcome: Progressing     Problem: Pain - Adult  Goal: Verbalizes/displays adequate comfort level or baseline comfort level  Outcome: Progressing     Problem: Safety - Adult  Goal: Free from fall injury  Outcome: Progressing     Problem: Discharge Planning  Goal: Discharge to home or other facility with appropriate resources  Outcome: Progressing     Problem: Chronic Conditions and Co-morbidities  Goal: Patient's chronic conditions and co-morbidity symptoms are monitored and maintained or improved  Outcome: Progressing     Problem: Pain  Goal: My pain/discomfort is manageable  Outcome: Progressing     Problem: Safety  Goal: Patient will be injury free during hospitalization  Outcome: Progressing  Goal: I will remain free of falls  Outcome: Progressing     Problem: Daily Care  Goal: Daily care needs are met  Outcome: Progressing     Problem: Psychosocial Needs  Goal: Demonstrates ability to cope with hospitalization/illness  Outcome: Progressing  Goal: Collaborate with me, my family, and caregiver to identify my specific goals  Outcome: Progressing     Problem: Discharge Barriers  Goal: My discharge needs are met  Outcome: Progressing

## 2024-03-14 NOTE — DISCHARGE INSTRUCTIONS
Please follow-up with your primary care provider within 7 days for hospital follow-up.  Additionally you will need to follow up with [facility] ([facility]) as soon as possible for your recent lab tests, their contact information is both above and listed again below. Please call to make these appointments    We have newly prescribed Biktarvy 1 tablet daily, but have made no other medication changes. Please take your medications as prescribed.    If you have any new or worsening symptoms, seek medical attention.    Thank you for allowing us to participate in your medical care!    -[facility] teaching service    [facility]  [place] [address] [place] [address]  General information: [phone]

## 2024-03-14 NOTE — PROGRESS NOTES
Josue Quintanilla is a 53 y.o. male on day 2 of admission presenting with Encephalopathy.    Subjective   No acute overnight events. Patient seen and evaluated at bedside. Headache has improved since arrival, but persists in the frontal region. Denies any chest pain, shortness of breath, fevers, chills, abdominal pain, or diarrhea. Notes that he had mono as a teenager/young adult.    Objective     Last Recorded Vitals  BP 93/64   Pulse 68   Temp 36.8 °C (98.2 °F)   Resp 19   Wt 92.8 kg (204 lb 9.4 oz)   SpO2 98%   Intake/Output last 3 Shifts:    Intake/Output Summary (Last 24 hours) at 3/14/2024 1125  Last data filed at 3/13/2024 1732  Gross per 24 hour   Intake 1466.67 ml   Output 1250 ml   Net 216.67 ml     Admission Weight  Weight: 93 kg (205 lb) (03/12/24 1553)    Daily Weight  03/12/24 : 92.8 kg (204 lb 9.4 oz)    Image Results  CT chest wo IV contrast  Narrative: Interpreted By:  Jenaro Jang,   STUDY:  CT CHEST WO IV CONTRAST;  3/13/2024 4:22 pm      INDICATION:  Signs/Symptoms:infiltrates seen on CT A/P.      COMPARISON:  None.      ACCESSION NUMBER(S):  [id number]      ORDERING CLINICIAN:  JUHI CHAN      TECHNIQUE:  Helical data acquisition of the chest was obtained  without IV  contrast.  Images were reformatted in axial, coronal, and sagittal  planes.      FINDINGS:  LUNGS and AIRWAYS:  Mild bibasilar atelectasis. Trace pleural effusions. Mild bibasilar  interstitial edema. Small calcified granuloma left lower lobe. The  central airways are clear without bronchiectasis.      MEDIASTINUM and KYLE, LOWER NECK AND AXILLA:  The visualized thyroid gland is grossly unremarkable.      No thoracic lymphadenopathy by CT criteria.      Esophagus is not dilated.      HEART and VESSELS:  The heart is normal in gross morphology and size.      No significant pericardial effusion.      No thoracic aortic aneurysm      UPPER ABDOMEN:  The visualized subdiaphragmatic structures demonstrate no acute  findings on  noncontrast images.      CHEST WALL and OSSEOUS STRUCTURES:  No suspicious osseous lesions. Multilevel degenerative changes of the  thoracic spine.          Impression: 1.  Mild bibasilar atelectasis.  2. Mild bibasilar interstitial edema and trace pleural effusions.          MACRO:  None.      Signed by: Jenaro Jang 3/13/2024 4:34 PM  Dictation workstation:   [id number]  MR brain w and wo IV contrast  Narrative: Interpreted By:  Anjelica Fountain and Muddasani Dheeraj   STUDY:  MR BRAIN W AND WO IV CONTRAST; 3/13/2024 11:59 am      INDICATION:  Signs/Symptoms:r/o abscess.      COMPARISON:  CT of the head 03/12/2024.      ACCESSION NUMBER(S):  [id number]      ORDERING CLINICIAN:  TYRA LARSON      TECHNIQUE:  Axial T2, FLAIR, DWI, gradient echo T2 and T1 weighted images of the  brain were acquired. Postcontrast T1 weighted images were acquired  after administration of 19 mL Dotarem gadolinium based intravenous  contrast.      FINDINGS:  Images are somewhat degraded by motion.      CSF Spaces: Ventricles, sulci and basal cisterns are within normal  limits. No abnormal extra-axial collection.      Parenchyma: No restricted diffusion to suggest acute infarction. A  minimal burden of nonspecific T2 and FLAIR hyperintense signal is  scattered throughout the supratentorial white matter. T2 hyperintense  regions within the inferior basal ganglia are favored to represent  prominent perivascular spaces. No evidence of intracranial  hemorrhage. Slight bowing of the septum pellucidum to the left is  within normal limits. No mass effect or midline shift. No abnormal  enhancement.      Paranasal Sinuses and Mastoids: Visualized paranasal sinuses and  mastoid air cells are clear.      The major intracranial flow voids are patent. No abnormal filling  defect is identified within the dural venous sinuses.      Impression: No evidence of intracranial abscess.      Minimal white-matter changes are entirely non-specific and may  be  seen with migraine headaches, chronic hypertension, small-vessel  ischemic disease, among others. No evidence of acute ischemic injury  or intracranial mass.      I personally reviewed the images/study and I agree with the findings  as stated by Dr. Yarbrough.      MACRO:  None      Signed by: Anjelica Fountain 3/13/2024 12:56 PM  Dictation workstation:   [id number]  CT abdomen pelvis w IV contrast  Narrative: Interpreted By:  Jenaro Jang,   STUDY:  CT ABDOMEN PELVIS W IV CONTRAST;  3/13/2024 11:41 am      INDICATION:  Signs/Symptoms:r/o abscess.      COMPARISON:  None.      ACCESSION NUMBER(S):  [id number]      ORDERING CLINICIAN:  TYRA LARSON      TECHNIQUE:  CT of the abdomen and pelvis was performed.  Contiguous axial images  were obtained at 3 mm slice thickness through the abdomen and pelvis.  Coronal and sagittal reconstructions at 3 mm slice thickness were  performed. 75 mL Omnipaque 350 administered intravenously without  immediate complication.      FINDINGS:  LOWER CHEST:  Bibasilar atelectasis.      ABDOMEN:      LIVER:  Unremarkable      BILE DUCTS:  Not dilated.      GALLBLADDER:  No calcified stone or definite inflammation.      PANCREAS:  Unremarkable      SPLEEN:  Enlarged measuring 14.4 cm in length.      ADRENAL GLANDS:  Unremarkable      KIDNEYS AND URETERS:  Small cyst in the mid right kidney. Otherwise unremarkable kidneys  without hydronephrosis.      PELVIS:      BLADDER:  Nondistended.      REPRODUCTIVE ORGANS:  No visualized acute abnormalities.      BOWEL:  Perirectal stranding is noted. There may be some rectal wall  thickening as well as adjacent prominent lymph nodes measuring up to  9 mm short axis. No findings of bowel obstruction otherwise.  Unremarkable appendix. Unremarkable mesentery.          VESSELS:  Aortoiliac system is patent without aneurysm.  Major visceral  branches are patent. IVC and major branches are grossly patent.  Major portal venous branches are patent.       PERITONEUM AND RETROPERITONEUM:  No free fluid or free air.   Prominent perirectal lymph nodes. No  abdominal lymphadenopathy.      BONE AND ABDOMINAL WALL:  No acute skeletal findings.   Degenerative changes of the spine.          Impression: Mild rectal wall thickening with adjacent fat stranding and prominent  lymph nodes suggestive of proctitis although underlying mass should  be excluded. Suggest clinical correlation and nonemergent direct  visualization to exclude neoplasm. No abscess otherwise identified.      MACRO:  None.      Signed by: Jenaro Jang 3/13/2024 12:14 PM  Dictation workstation:   [id number]    Physical Exam  Constitutional:       General: He is not in acute distress.     Appearance: Normal appearance. He is not toxic-appearing.   HENT:      Head: Normocephalic and atraumatic.      Mouth/Throat:      Mouth: Mucous membranes are moist.      Pharynx: No posterior oropharyngeal erythema.   Eyes:      General: No scleral icterus.     Extraocular Movements: Extraocular movements intact.   Cardiovascular:      Rate and Rhythm: Normal rate and regular rhythm.      Heart sounds: No murmur heard.     No friction rub. No gallop.   Pulmonary:      Effort: Pulmonary effort is normal. No respiratory distress.      Breath sounds: No wheezing, rhonchi or rales.   Abdominal:      General: Abdomen is flat. There is no distension.      Palpations: Abdomen is soft.      Tenderness: There is no abdominal tenderness. There is no guarding.   Musculoskeletal:         General: No swelling or tenderness.      Cervical back: No rigidity or tenderness.   Lymphadenopathy:      Cervical: Cervical adenopathy (Right anterior chain) present.   Skin:     General: Skin is warm and dry.   Neurological:      General: No focal deficit present.      Mental Status: He is alert and oriented to person, place, and time.      Motor: No weakness.   Relevant Results  Scheduled medications  enoxaparin, 40 mg, subcutaneous,  Daily  polyethylene glycol, 17 g, oral, Daily    Continuous medications     PRN medications  PRN medications: ondansetron, oxyCODONE, oxyCODONE, oxygen  Results for orders placed or performed during the hospital encounter of 03/12/24 (from the past 24 hour(s))   Syphilis Screen with Reflex   Result Value Ref Range    Syphilis Total Ab Reactive (A) Nonreactive   RPR   Result Value Ref Range    RPR  Nonreactive Nonreactive   Rayne-Barr Virus Antibody Panel (VCA IgG/IgM, EA IgG, NA IgG)   Result Value Ref Range    EBV VCA, IgG  Positive (A) Negative    EBV VCA, IgM  Negative Negative    EBV Early Antigen Antibody, IgG Negative Negative    EBV Nuclear Antigen Antibody, IgG Positive (A) Negative   Drug Screen, Urine   Result Value Ref Range    Amphetamine Screen, Urine Presumptive Negative Presumptive Negative    Barbiturate Screen, Urine Presumptive Negative Presumptive Negative    Benzodiazepines Screen, Urine Presumptive Negative Presumptive Negative    Cannabinoid Screen, Urine Presumptive Negative Presumptive Negative    Cocaine Metabolite Screen, Urine Presumptive Negative Presumptive Negative    Fentanyl Screen, Urine Presumptive Negative Presumptive Negative    Opiate Screen, Urine Presumptive Negative Presumptive Negative    Oxycodone Screen, Urine Presumptive Negative Presumptive Negative    PCP Screen, Urine Presumptive Negative Presumptive Negative    Methadone Screen, Urine Presumptive Negative Presumptive Negative   SST TOP   Result Value Ref Range    Extra Tube Hold for add-ons.    Magnesium   Result Value Ref Range    Magnesium 2.09 1.60 - 2.40 mg/dL   CBC and Auto Differential   Result Value Ref Range    WBC 2.7 (L) 4.4 - 11.3 x10*3/uL    nRBC 0.0 0.0 - 0.0 /100 WBCs    RBC 4.62 4.50 - 5.90 x10*6/uL    Hemoglobin 12.9 (L) 13.5 - 17.5 g/dL    Hematocrit 38.0 (L) 41.0 - 52.0 %    MCV 82 80 - 100 fL    MCH 27.9 26.0 - 34.0 pg    MCHC 33.9 32.0 - 36.0 g/dL    RDW 12.6 11.5 - 14.5 %    Platelets 79 (L) 150  - 450 x10*3/uL    Immature Granulocytes %, Automated 0.0 0.0 - 0.9 %    Immature Granulocytes Absolute, Automated 0.00 0.00 - 0.70 x10*3/uL   Comprehensive Metabolic Panel   Result Value Ref Range    Glucose 105 (H) 74 - 99 mg/dL    Sodium 132 (L) 136 - 145 mmol/L    Potassium 3.8 3.5 - 5.3 mmol/L    Chloride 97 (L) 98 - 107 mmol/L    Bicarbonate 31 21 - 32 mmol/L    Anion Gap 8 (L) 10 - 20 mmol/L    Urea Nitrogen 9 6 - 23 mg/dL    Creatinine 0.91 0.50 - 1.30 mg/dL    eGFR >90 >60 mL/min/1.73m*2    Calcium 7.7 (L) 8.6 - 10.3 mg/dL    Albumin 3.4 3.4 - 5.0 g/dL    Alkaline Phosphatase 27 (L) 33 - 120 U/L    Total Protein 5.5 (L) 6.4 - 8.2 g/dL    AST 62 (H) 9 - 39 U/L    Bilirubin, Total 0.5 0.0 - 1.2 mg/dL    ALT 40 10 - 52 U/L   Manual Differential   Result Value Ref Range    Neutrophils %, Manual 39.0 40.0 - 80.0 %    Bands %, Manual 9.0 0.0 - 5.0 %    Lymphocytes %, Manual 48.0 13.0 - 44.0 %    Monocytes %, Manual 3.0 2.0 - 10.0 %    Eosinophils %, Manual 0.0 0.0 - 6.0 %    Basophils %, Manual 0.0 0.0 - 2.0 %    Myelocytes %, Manual 1.0 0.0 - 0.0 %    Seg Neutrophils Absolute, Manual 1.05 (L) 1.20 - 7.00 x10*3/uL    Bands Absolute, Manual 0.24 0.00 - 0.70 x10*3/uL    Lymphocytes Absolute, Manual 1.30 1.20 - 4.80 x10*3/uL    Monocytes Absolute, Manual 0.08 (L) 0.10 - 1.00 x10*3/uL    Eosinophils Absolute, Manual 0.00 0.00 - 0.70 x10*3/uL    Basophils Absolute, Manual 0.00 0.00 - 0.10 x10*3/uL    Myelocytes Absolute, Manual 0.03 0.00 - 0.00 x10*3/uL    Total Cells Counted 100     Neutrophils Absolute, Manual 1.29 1.20 - 7.70 x10*3/uL    RBC Morphology See Below     Giant Platelets Few      CT chest wo IV contrast    Result Date: 3/13/2024  Interpreted By:  Jenaro Jang, STUDY: CT CHEST WO IV CONTRAST;  3/13/2024 4:22 pm   INDICATION: Signs/Symptoms:infiltrates seen on CT A/P.   COMPARISON: None.   ACCESSION NUMBER(S): [id number]   ORDERING CLINICIAN: JUHI CHAN   TECHNIQUE: Helical data acquisition of the chest  was obtained  without IV contrast.  Images were reformatted in axial, coronal, and sagittal planes.   FINDINGS: LUNGS and AIRWAYS: Mild bibasilar atelectasis. Trace pleural effusions. Mild bibasilar interstitial edema. Small calcified granuloma left lower lobe. The central airways are clear without bronchiectasis.   MEDIASTINUM and KYLE, LOWER NECK AND AXILLA: The visualized thyroid gland is grossly unremarkable.   No thoracic lymphadenopathy by CT criteria.   Esophagus is not dilated.   HEART and VESSELS: The heart is normal in gross morphology and size.   No significant pericardial effusion.   No thoracic aortic aneurysm   UPPER ABDOMEN: The visualized subdiaphragmatic structures demonstrate no acute findings on noncontrast images.   CHEST WALL and OSSEOUS STRUCTURES: No suspicious osseous lesions. Multilevel degenerative changes of the thoracic spine.         1.  Mild bibasilar atelectasis. 2. Mild bibasilar interstitial edema and trace pleural effusions.     MACRO: None.   Signed by: Jenaro Jang 3/13/2024 4:34 PM Dictation workstation:   [id number]    MR brain w and wo IV contrast    Result Date: 3/13/2024  Interpreted By:  Anjelica Fountain and Muddasani Teddy STUDY: MR BRAIN W AND WO IV CONTRAST; 3/13/2024 11:59 am   INDICATION: Signs/Symptoms:r/o abscess.   COMPARISON: CT of the head 03/12/2024.   ACCESSION NUMBER(S): [id number]   ORDERING CLINICIAN: TYRA LARSON   TECHNIQUE: Axial T2, FLAIR, DWI, gradient echo T2 and T1 weighted images of the brain were acquired. Postcontrast T1 weighted images were acquired after administration of 19 mL Dotarem gadolinium based intravenous contrast.   FINDINGS: Images are somewhat degraded by motion.   CSF Spaces: Ventricles, sulci and basal cisterns are within normal limits. No abnormal extra-axial collection.   Parenchyma: No restricted diffusion to suggest acute infarction. A minimal burden of nonspecific T2 and FLAIR hyperintense signal is scattered throughout the  supratentorial white matter. T2 hyperintense regions within the inferior basal ganglia are favored to represent prominent perivascular spaces. No evidence of intracranial hemorrhage. Slight bowing of the septum pellucidum to the left is within normal limits. No mass effect or midline shift. No abnormal enhancement.   Paranasal Sinuses and Mastoids: Visualized paranasal sinuses and mastoid air cells are clear.   The major intracranial flow voids are patent. No abnormal filling defect is identified within the dural venous sinuses.       No evidence of intracranial abscess.   Minimal white-matter changes are entirely non-specific and may be seen with migraine headaches, chronic hypertension, small-vessel ischemic disease, among others. No evidence of acute ischemic injury or intracranial mass.   I personally reviewed the images/study and I agree with the findings as stated by Dr. Yarbrough.   MACRO: None   Signed by: Anjelica Fountain 3/13/2024 12:56 PM Dictation workstation:   [id number]    CT abdomen pelvis w IV contrast    Result Date: 3/13/2024  Interpreted By:  Jenaro Jang, STUDY: CT ABDOMEN PELVIS W IV CONTRAST;  3/13/2024 11:41 am   INDICATION: Signs/Symptoms:r/o abscess.   COMPARISON: None.   ACCESSION NUMBER(S): [id number]   ORDERING CLINICIAN: TYRA LARSON   TECHNIQUE: CT of the abdomen and pelvis was performed.  Contiguous axial images were obtained at 3 mm slice thickness through the abdomen and pelvis. Coronal and sagittal reconstructions at 3 mm slice thickness were performed. 75 mL Omnipaque 350 administered intravenously without immediate complication.   FINDINGS: LOWER CHEST: Bibasilar atelectasis.   ABDOMEN:   LIVER: Unremarkable   BILE DUCTS: Not dilated.   GALLBLADDER: No calcified stone or definite inflammation.   PANCREAS: Unremarkable   SPLEEN: Enlarged measuring 14.4 cm in length.   ADRENAL GLANDS: Unremarkable   KIDNEYS AND URETERS: Small cyst in the mid right kidney. Otherwise unremarkable  kidneys without hydronephrosis.   PELVIS:   BLADDER: Nondistended.   REPRODUCTIVE ORGANS: No visualized acute abnormalities.   BOWEL: Perirectal stranding is noted. There may be some rectal wall thickening as well as adjacent prominent lymph nodes measuring up to 9 mm short axis. No findings of bowel obstruction otherwise. Unremarkable appendix. Unremarkable mesentery.     VESSELS: Aortoiliac system is patent without aneurysm.  Major visceral branches are patent. IVC and major branches are grossly patent. Major portal venous branches are patent.   PERITONEUM AND RETROPERITONEUM: No free fluid or free air.   Prominent perirectal lymph nodes. No abdominal lymphadenopathy.   BONE AND ABDOMINAL WALL: No acute skeletal findings.   Degenerative changes of the spine.         Mild rectal wall thickening with adjacent fat stranding and prominent lymph nodes suggestive of proctitis although underlying mass should be excluded. Suggest clinical correlation and nonemergent direct visualization to exclude neoplasm. No abscess otherwise identified.   MACRO: None.   Signed by: Jenaro Jang 3/13/2024 12:14 PM Dictation workstation:   [id number]    XR chest 1 view    Result Date: 3/12/2024  Interpreted By:  Tomer Giron, STUDY: XR CHEST 1 VIEW;  3/12/2024 6:48 pm   INDICATION: Signs/Symptoms:hyponatremia, rule out pneumonia.   COMPARISON: None.   ACCESSION NUMBER(S): [id number]   ORDERING CLINICIAN: BEE SCHUSTER   FINDINGS:     The cardiomediastinal silhouette and pulmonary vasculature are within normal limits. No consolidation, pleural effusion or pneumothorax.         No acute cardiopulmonary process.     MACRO: None.   Signed by: Tomer Giron 3/12/2024 6:51 PM Dictation workstation:   [id number]    CT head wo IV contrast    Result Date: 3/12/2024  Interpreted By:  Tod Velasquez, STUDY: CT HEAD WO IV CONTRAST;  3/12/2024 5:07 pm   INDICATION: Signs/Symptoms:headache.   COMPARISON: 01/22/2017   ACCESSION NUMBER(S):  [id number]   ORDERING CLINICIAN: BEE SCHUSTER   TECHNIQUE: Noncontrast axial CT scan of head was performed. Angled reformats in brain and bone windows were generated. The images were reviewed in bone, brain, blood and soft tissue windows.   FINDINGS: There is no intra/extra-axial fluid collection, mass effect, or midline shift. The gray/white matter junction is preserved. The basal cisterns are patent. Visualized paranasal sinuses and mastoid air cells are clear. The calvarium is intact.       No acute intracranial finding.   Signed by: Tod Velasquez 3/12/2024 5:17 PM Dictation workstation:   [id number]     Assessment/Plan   Principal Problem:    Encephalopathy  Active Problems:    Headache    Fever    Hyponatremia    Thrombocytopenia (CMS/HCC)    53-year-old male with PMHx significant for ADHD on Vyvanse who initially presented with altered mental status and headache. Initial workup was significant for hyponatremia, hypochloremia, leukopenia, and thrombocytopenia. There was concern for aseptic meningitis and LP was performed while in the ED. CSF studies suggestive for aseptic meningitis rather than bacterial or viral meningitis. CSF viral PCR studies negative. UA did show 3+ proteinuria and 1+ hematuria without signs of infections. Differential diagnosis includes metabolic encephalopathy 2/2 aseptic meninigitis vs. Symptomatic hyponatremia vs. New acute viral infection. ID and neurology were consulted for further guidance. Initial HIV screen positive, reflex confirmation pending. Additionally, syphilis screen positive.    Metabolic encephalopathy 2/2 acute HIV infection  Leukopenia  Thrombocytopenia  - HIV initially reactive, reflex confirmation negative. HIV RNA >10,000,000  - Hep screen, chlamydia/gonorrhea test negative  - Syphilis screen was reactive, but RPR negative  - EBV nuclear antigen IgG positive however early antigen IgG negative, patient does have a known history of mono which is consistent with  lab result  - CXR negative for acute process  - CT chest showed mild bibasilar atelectasis, mild bibasilar interstitial edema and trace pleural effusions  - CT head and MRI brain negative for acute findings  - CT abdomen w IV contrast showed possible proctitis, but no abscess  - UDS positive for amphetamine and opiates, however patient is on Vyvanse at home, and received opiates for pain control  - CSF studies consistent with normal studies.  - CD4/8, CSF VDRL, TB spot, Fungitell, TPPA, HLAB57, UPEP, SPEP, HIV genotype all ordered  - Will start Biktarvy per ID  - Pneumovax 23 vaccination administered  - Neurology consulted, low suspicion for CNS infection, RCVS, CVST, stroke, meningitis/encephalitis, signed off. Appreciate recs.  - ID consulted, will start Biktarvy and follow CSF VDRL to determine need for penicillin, appreciate recs  - Trend CBC and RFP      New onset proteinuria/hematuria  Consider focal segmental glomerulosclerosis, may benefit from outpatient Nephrology follow up  - spot protein/cr ratio 0.86 mg/mg creatinine,  - ROLLY pending  - C3 decreased at 76, C4 WNL  - PLA2R pending    Hyponatremia - resolving  Hypochloremia  - Hyponatremic with sodium 125 on admission, likely in the setting of poor p.o. intake. Incrementing appropriately with PO intake.  - Urine sodium 36, urine creatinine 221.6, FeNa 0.1%  - Urine osmolality 771, serum osmolality 267  - Trend RFP    DIET:  regular  DVT PPX: lovenox  ABx: None  CODE STATUS: full code    Disposition: Will need further monitoring per ID while starting antiretroviral therapy, as well as resolution of CSF VDRL to determine need for extended course of penicillin. Anticipate 1-2+ additional midnights.    The assessment and plan were discussed with the senior resident and attending physician,  Maico Esparza, DO PGY-1

## 2024-03-14 NOTE — PROGRESS NOTES
Josue Quintanilla is a 53 y.o. male on day 2 of admission presenting with Encephalopathy.      Subjective   No events overnight. Patient continues to endorse fever, and mentions his headache resolved but now has returned.        Objective     Last Recorded Vitals  BP 93/64   Pulse 68   Temp 36.8 °C (98.2 °F)   Resp 19   Wt 92.8 kg (204 lb 9.4 oz)   SpO2 98%   Intake/Output last 3 Shifts:    Intake/Output Summary (Last 24 hours) at 3/14/2024 1445  Last data filed at 3/13/2024 1732  Gross per 24 hour   Intake --   Output 550 ml   Net -550 ml       Admission Weight  Weight: 93 kg (205 lb) (03/12/24 1553)    Daily Weight  03/12/24 : 92.8 kg (204 lb 9.4 oz)    Image Results  CT chest wo IV contrast  Narrative: Interpreted By:  Jenaro Jang,   STUDY:  CT CHEST WO IV CONTRAST;  3/13/2024 4:22 pm      INDICATION:  Signs/Symptoms:infiltrates seen on CT A/P.      COMPARISON:  None.      ACCESSION NUMBER(S):  [id number]      ORDERING CLINICIAN:  JUHI CHAN      TECHNIQUE:  Helical data acquisition of the chest was obtained  without IV  contrast.  Images were reformatted in axial, coronal, and sagittal  planes.      FINDINGS:  LUNGS and AIRWAYS:  Mild bibasilar atelectasis. Trace pleural effusions. Mild bibasilar  interstitial edema. Small calcified granuloma left lower lobe. The  central airways are clear without bronchiectasis.      MEDIASTINUM and KYLE, LOWER NECK AND AXILLA:  The visualized thyroid gland is grossly unremarkable.      No thoracic lymphadenopathy by CT criteria.      Esophagus is not dilated.      HEART and VESSELS:  The heart is normal in gross morphology and size.      No significant pericardial effusion.      No thoracic aortic aneurysm      UPPER ABDOMEN:  The visualized subdiaphragmatic structures demonstrate no acute  findings on noncontrast images.      CHEST WALL and OSSEOUS STRUCTURES:  No suspicious osseous lesions. Multilevel degenerative changes of the  thoracic spine.          Impression: 1.   Mild bibasilar atelectasis.  2. Mild bibasilar interstitial edema and trace pleural effusions.          MACRO:  None.      Signed by: Jenaroabida Jang 3/13/2024 4:34 PM  Dictation workstation:   [id number]  MR brain w and wo IV contrast  Narrative: Interpreted By:  Anjelica Fountain and Muddasani Dheeraj   STUDY:  MR BRAIN W AND WO IV CONTRAST; 3/13/2024 11:59 am      INDICATION:  Signs/Symptoms:r/o abscess.      COMPARISON:  CT of the head 03/12/2024.      ACCESSION NUMBER(S):  [id number]      ORDERING CLINICIAN:  TYRA LARSON      TECHNIQUE:  Axial T2, FLAIR, DWI, gradient echo T2 and T1 weighted images of the  brain were acquired. Postcontrast T1 weighted images were acquired  after administration of 19 mL Dotarem gadolinium based intravenous  contrast.      FINDINGS:  Images are somewhat degraded by motion.      CSF Spaces: Ventricles, sulci and basal cisterns are within normal  limits. No abnormal extra-axial collection.      Parenchyma: No restricted diffusion to suggest acute infarction. A  minimal burden of nonspecific T2 and FLAIR hyperintense signal is  scattered throughout the supratentorial white matter. T2 hyperintense  regions within the inferior basal ganglia are favored to represent  prominent perivascular spaces. No evidence of intracranial  hemorrhage. Slight bowing of the septum pellucidum to the left is  within normal limits. No mass effect or midline shift. No abnormal  enhancement.      Paranasal Sinuses and Mastoids: Visualized paranasal sinuses and  mastoid air cells are clear.      The major intracranial flow voids are patent. No abnormal filling  defect is identified within the dural venous sinuses.      Impression: No evidence of intracranial abscess.      Minimal white-matter changes are entirely non-specific and may be  seen with migraine headaches, chronic hypertension, small-vessel  ischemic disease, among others. No evidence of acute ischemic injury  or intracranial mass.      I  personally reviewed the images/study and I agree with the findings  as stated by Dr. Yarbrough.      MACRO:  None      Signed by: Anjelica Fountain 3/13/2024 12:56 PM  Dictation workstation:   [id number]  CT abdomen pelvis w IV contrast  Narrative: Interpreted By:  Jenaro Jang,   STUDY:  CT ABDOMEN PELVIS W IV CONTRAST;  3/13/2024 11:41 am      INDICATION:  Signs/Symptoms:r/o abscess.      COMPARISON:  None.      ACCESSION NUMBER(S):  [id number]      ORDERING CLINICIAN:  TYRA LARSON      TECHNIQUE:  CT of the abdomen and pelvis was performed.  Contiguous axial images  were obtained at 3 mm slice thickness through the abdomen and pelvis.  Coronal and sagittal reconstructions at 3 mm slice thickness were  performed. 75 mL Omnipaque 350 administered intravenously without  immediate complication.      FINDINGS:  LOWER CHEST:  Bibasilar atelectasis.      ABDOMEN:      LIVER:  Unremarkable      BILE DUCTS:  Not dilated.      GALLBLADDER:  No calcified stone or definite inflammation.      PANCREAS:  Unremarkable      SPLEEN:  Enlarged measuring 14.4 cm in length.      ADRENAL GLANDS:  Unremarkable      KIDNEYS AND URETERS:  Small cyst in the mid right kidney. Otherwise unremarkable kidneys  without hydronephrosis.      PELVIS:      BLADDER:  Nondistended.      REPRODUCTIVE ORGANS:  No visualized acute abnormalities.      BOWEL:  Perirectal stranding is noted. There may be some rectal wall  thickening as well as adjacent prominent lymph nodes measuring up to  9 mm short axis. No findings of bowel obstruction otherwise.  Unremarkable appendix. Unremarkable mesentery.          VESSELS:  Aortoiliac system is patent without aneurysm.  Major visceral  branches are patent. IVC and major branches are grossly patent.  Major portal venous branches are patent.      PERITONEUM AND RETROPERITONEUM:  No free fluid or free air.   Prominent perirectal lymph nodes. No  abdominal lymphadenopathy.      BONE AND ABDOMINAL WALL:  No acute  skeletal findings.   Degenerative changes of the spine.          Impression: Mild rectal wall thickening with adjacent fat stranding and prominent  lymph nodes suggestive of proctitis although underlying mass should  be excluded. Suggest clinical correlation and nonemergent direct  visualization to exclude neoplasm. No abscess otherwise identified.      MACRO:  None.      Signed by: Jenaro Jang 3/13/2024 12:14 PM  Dictation workstation:   [id number]      Physical Exam  General: Patient does not appear to be in any acute distress, alert, awake  Head: Normocephalic, Atraumatic   Eyes: Normal external exam, EOMI  ENT: Normal external inspection of ears and nose. Thrush in posterior oropharynx. Bite geo on inner cheek. Cervical LAD  Cardiovascular: RRR, S1/S2, no murmurs, rubs, or gallops, radial pulses +2, no edema of extremities  Pulmonary: CTAB, no respiratory distress.  Abdomen: soft, non-tender, nondistended  MSK: No joint swelling, normal movements of all extremities  Neuro: No focal neuro deficits  Skin- Warm. Dry. No lesions, contusions, or erythema.  Relevant Results               Assessment/Plan        Assessment:  1) Acute Metabolic Encephalopathy 2/2 Acute HIV infection  2) Syphilis?  3) Hyponatremia  4) Transaminitis     Plan:  1) HIV PCR positive, likely indicating acute infection and will start on ART with Biktarvy  2) Order HIV genotype, and follow CD4 counts  3) Syphilis screening positive, but RPR negative suggesting possible false positive; Will follow CSF VDRL to determine if patient needs a course of penicillin  4) Discussed with patient and wife that she will need to be tested for HIV     Case seen and discussed with Dr. Sanjay England DO  PGY-2 Internal Medicine

## 2024-03-14 NOTE — NURSING NOTE
Pt had no acute changes in condition throughout the night. Pt was given 10mg oxy for headache 7/10. Headache subsided with medication. Pt had no changes in neuro checks. Pt is in bed with call light in reach.

## 2024-03-14 NOTE — ED PROCEDURE NOTE
Procedure  Lumbar Puncture    Performed by: Nicole Peguero DO  Authorized by: Surya Hdez DO    Consent:     Consent obtained:  Written    Consent given by:  Patient    Risks, benefits, and alternatives were discussed: yes      Risks discussed:  Bleeding, infection, nerve damage, pain, repeat procedure and headache    Alternatives discussed:  No treatment and observation  Universal protocol:     Procedure explained and questions answered to patient or proxy's satisfaction: yes      Relevant documents present and verified: yes      Test results available: yes      Imaging studies available: yes      Immediately prior to procedure a time out was called: yes      Site/side marked: yes      Patient identity confirmed:  Verbally with patient  Pre-procedure details:     Procedure purpose:  Diagnostic    Preparation: Patient was prepped and draped in usual sterile fashion    Sedation:     Sedation type:  None  Anesthesia:     Anesthesia method:  Local infiltration    Local anesthetic:  Lidocaine 1% w/o epi  Procedure details:     Lumbar space:  L4-L5 interspace    Patient position:  Sitting    Needle gauge:  22    Needle type:  Spinal needle - Quincke tip    Needle length (in):  3.5    Ultrasound guidance: no      Number of attempts:  1    Fluid appearance:  Clear    Tubes of fluid:  4    Total volume (ml):  5  Post-procedure details:     Puncture site:  Adhesive bandage applied    Procedure completion:  Tolerated well, no immediate complications               Nicole Peguero DO  Resident  03/13/24 9718

## 2024-03-14 NOTE — NURSING NOTE
No acute events occurred and safety was maintained. Education was provided on Biktarvy and Pt stated understanding. No complaints of pain or discomfort this shift. Pt very flat and quite in demeanor in room all day; Pt denied any depressive thoughts. Pt stated that his wife is aware of situation but does not want full information shared with anyone and had visiting family leave room while staff was at bedside. Pt resting in bed with wife at this time of this note. Call light within reach.     India Beatty RN

## 2024-03-15 ENCOUNTER — TELEPHONE (OUTPATIENT)
Dept: IMMUNOLOGY | Facility: CLINIC | Age: 54
End: 2024-03-15
Payer: COMMERCIAL

## 2024-03-15 ENCOUNTER — PHARMACY VISIT (OUTPATIENT)
Dept: PHARMACY | Facility: CLINIC | Age: 54
End: 2024-03-15
Payer: MEDICARE

## 2024-03-15 VITALS
HEIGHT: 72 IN | DIASTOLIC BLOOD PRESSURE: 66 MMHG | TEMPERATURE: 98.4 F | HEART RATE: 58 BPM | WEIGHT: 204.59 LBS | OXYGEN SATURATION: 95 % | SYSTOLIC BLOOD PRESSURE: 111 MMHG | RESPIRATION RATE: 18 BRPM | BODY MASS INDEX: 27.71 KG/M2

## 2024-03-15 PROBLEM — D72.819 LEUKOPENIA: Status: ACTIVE | Noted: 2024-03-15

## 2024-03-15 PROBLEM — Z21 ASYMPTOMATIC HIV INFECTION, WITH NO HISTORY OF HIV-RELATED ILLNESS (MULTI): Status: ACTIVE | Noted: 2024-03-15

## 2024-03-15 PROBLEM — B20 SYMPTOMATIC HIV INFECTION (MULTI): Status: ACTIVE | Noted: 2024-03-15

## 2024-03-15 PROBLEM — R51.9 HEADACHE: Status: RESOLVED | Noted: 2024-03-12 | Resolved: 2024-03-15

## 2024-03-15 LAB
ALBUMIN SERPL BCP-MCNC: 3.6 G/DL (ref 3.4–5)
ALP SERPL-CCNC: 31 U/L (ref 33–120)
ALT SERPL W P-5'-P-CCNC: 47 U/L (ref 10–52)
ANION GAP SERPL CALC-SCNC: 10 MMOL/L (ref 10–20)
AST SERPL W P-5'-P-CCNC: 67 U/L (ref 9–39)
BASOPHILS # BLD AUTO: 0.01 X10*3/UL (ref 0–0.1)
BASOPHILS NFR BLD AUTO: 0.4 %
BILIRUB SERPL-MCNC: 0.6 MG/DL (ref 0–1.2)
BUN SERPL-MCNC: 8 MG/DL (ref 6–23)
CALCIUM SERPL-MCNC: 8.4 MG/DL (ref 8.6–10.3)
CHLORIDE SERPL-SCNC: 100 MMOL/L (ref 98–107)
CO2 SERPL-SCNC: 28 MMOL/L (ref 21–32)
CREAT SERPL-MCNC: 0.82 MG/DL (ref 0.5–1.3)
EGFRCR SERPLBLD CKD-EPI 2021: >90 ML/MIN/1.73M*2
EOSINOPHIL # BLD AUTO: 0 X10*3/UL (ref 0–0.7)
EOSINOPHIL NFR BLD AUTO: 0 %
ERYTHROCYTE [DISTWIDTH] IN BLOOD BY AUTOMATED COUNT: 12.3 % (ref 11.5–14.5)
FUNGITELL BETA-D GLUCAN,SERUM: <31 PG/ML
GIANT PLATELETS BLD QL SMEAR: NORMAL
GLUCOSE SERPL-MCNC: 103 MG/DL (ref 74–99)
HCT VFR BLD AUTO: 39.8 % (ref 41–52)
HGB BLD-MCNC: 13.6 G/DL (ref 13.5–17.5)
IMM GRANULOCYTES # BLD AUTO: 0 X10*3/UL (ref 0–0.7)
IMM GRANULOCYTES NFR BLD AUTO: 0 % (ref 0–0.9)
LYMPHOCYTES # BLD AUTO: 1.45 X10*3/UL (ref 1.2–4.8)
LYMPHOCYTES NFR BLD AUTO: 52.9 %
MAGNESIUM SERPL-MCNC: 2.44 MG/DL (ref 1.6–2.4)
MCH RBC QN AUTO: 27.9 PG (ref 26–34)
MCHC RBC AUTO-ENTMCNC: 34.2 G/DL (ref 32–36)
MCV RBC AUTO: 82 FL (ref 80–100)
MONOCYTES # BLD AUTO: 0.18 X10*3/UL (ref 0.1–1)
MONOCYTES NFR BLD AUTO: 6.6 %
NEUTROPHILS # BLD AUTO: 1.1 X10*3/UL (ref 1.2–7.7)
NEUTROPHILS NFR BLD AUTO: 40.1 %
NIL(NEG) CONTROL SPOT COUNT: NORMAL
NRBC BLD-RTO: 0 /100 WBCS (ref 0–0)
PANEL A SPOT COUNT: 0
PANEL B SPOT COUNT: 0
PLA2R IGG SERPL QL IF: NORMAL
PLATELET # BLD AUTO: 89 X10*3/UL (ref 150–450)
POS CONTROL SPOT COUNT: NORMAL
POTASSIUM SERPL-SCNC: 4.1 MMOL/L (ref 3.5–5.3)
PROT SERPL-MCNC: 6 G/DL (ref 6.4–8.2)
RBC # BLD AUTO: 4.87 X10*6/UL (ref 4.5–5.9)
RBC MORPH BLD: NORMAL
SODIUM SERPL-SCNC: 134 MMOL/L (ref 136–145)
T PALLIDUM AB SER QL AGGL: REACTIVE
T-SPOT. TB INTERPRETATION: NEGATIVE
VDRL CSF-ACNC: NONREACTIVE
WBC # BLD AUTO: 2.7 X10*3/UL (ref 4.4–11.3)

## 2024-03-15 PROCEDURE — 36415 COLL VENOUS BLD VENIPUNCTURE: CPT

## 2024-03-15 PROCEDURE — 2500000004 HC RX 250 GENERAL PHARMACY W/ HCPCS (ALT 636 FOR OP/ED)

## 2024-03-15 PROCEDURE — 85025 COMPLETE CBC W/AUTO DIFF WBC: CPT

## 2024-03-15 PROCEDURE — 2500000001 HC RX 250 WO HCPCS SELF ADMINISTERED DRUGS (ALT 637 FOR MEDICARE OP): Performed by: INTERNAL MEDICINE

## 2024-03-15 PROCEDURE — 99239 HOSP IP/OBS DSCHRG MGMT >30: CPT

## 2024-03-15 PROCEDURE — 80053 COMPREHEN METABOLIC PANEL: CPT

## 2024-03-15 PROCEDURE — 83735 ASSAY OF MAGNESIUM: CPT

## 2024-03-15 PROCEDURE — RXMED WILLOW AMBULATORY MEDICATION CHARGE

## 2024-03-15 RX ORDER — BICTEGRAVIR SODIUM, EMTRICITABINE, AND TENOFOVIR ALAFENAMIDE FUMARATE 50; 200; 25 MG/1; MG/1; MG/1
1 TABLET ORAL DAILY
Qty: 30 TABLET | Refills: 0 | Status: SHIPPED | OUTPATIENT
Start: 2024-03-15 | End: 2024-04-10 | Stop reason: SDUPTHER

## 2024-03-15 RX ADMIN — ENOXAPARIN SODIUM 40 MG: 40 INJECTION SUBCUTANEOUS at 09:17

## 2024-03-15 RX ADMIN — POLYETHYLENE GLYCOL 3350 17 G: 17 POWDER, FOR SOLUTION ORAL at 09:17

## 2024-03-15 RX ADMIN — BICTEGRAVIR SODIUM, EMTRICITABINE, AND TENOFOVIR ALAFENAMIDE FUMARATE 1 TABLET: 50; 200; 25 TABLET ORAL at 09:17

## 2024-03-15 ASSESSMENT — COGNITIVE AND FUNCTIONAL STATUS - GENERAL
TOILETING: A LITTLE
DAILY ACTIVITIY SCORE: 20
DRESSING REGULAR LOWER BODY CLOTHING: A LITTLE
HELP NEEDED FOR BATHING: A LITTLE
STANDING UP FROM CHAIR USING ARMS: A LITTLE
WALKING IN HOSPITAL ROOM: A LITTLE
CLIMB 3 TO 5 STEPS WITH RAILING: A LITTLE
DRESSING REGULAR UPPER BODY CLOTHING: A LITTLE
MOBILITY SCORE: 21

## 2024-03-15 ASSESSMENT — PAIN SCALES - GENERAL: PAINLEVEL_OUTOF10: 0 - NO PAIN

## 2024-03-15 ASSESSMENT — PAIN - FUNCTIONAL ASSESSMENT: PAIN_FUNCTIONAL_ASSESSMENT: 0-10

## 2024-03-15 NOTE — CARE PLAN
Pt had an uneventful night. Pt rested overnight. VSS. Pt refusing tele, notified Provider. Pt safety maintained and call light within reach.

## 2024-03-15 NOTE — TELEPHONE ENCOUNTER
Time Spent: 15 mins   EIS reached out to pt. Pt available to answer call.  Pt referred to EIS by ID MD (Dr. CLIFFORD Grace) and ABI [facility] (Dr. ANICETO Barker)   EIS scheduled [facility]   EIS screened for transportation barriers.   Pt currently does not have transportation barriers. Pt commutes via owned vehicle.   EIS asked pt if pt would like to know of RW facilities closer to residence. Pt prefers to be linked to [facility].   Pt requested for current partner (wife) to also attend. EIS assured pt current partner can receive testing at [facility].   EIS will complete RW at [facility].   EIS will confirm if pt would like EIS f/U for new dx.   EIS will f/U to engage in care.

## 2024-03-15 NOTE — PROGRESS NOTES
Josue Quintanilla is a 53 y.o. male on day 3 of admission presenting with Symptomatic HIV infection (CMS/HCC).      Subjective   Patient seen and examined at bedside. No events overnight.       Objective     Last Recorded Vitals  /66   Pulse 58   Temp 36.9 °C (98.4 °F)   Resp 18   Wt 92.8 kg (204 lb 9.4 oz)   SpO2 95%   Intake/Output last 3 Shifts:  No intake or output data in the 24 hours ending 03/15/24 1524      Admission Weight  Weight: 93 kg (205 lb) (03/12/24 1553)    Daily Weight  03/12/24 : 92.8 kg (204 lb 9.4 oz)    Image Results  CT chest wo IV contrast  Narrative: Interpreted By:  Jenaro Jang,   STUDY:  CT CHEST WO IV CONTRAST;  3/13/2024 4:22 pm      INDICATION:  Signs/Symptoms:infiltrates seen on CT A/P.      COMPARISON:  None.      ACCESSION NUMBER(S):  [id number]      ORDERING CLINICIAN:  JUHI CHAN      TECHNIQUE:  Helical data acquisition of the chest was obtained  without IV  contrast.  Images were reformatted in axial, coronal, and sagittal  planes.      FINDINGS:  LUNGS and AIRWAYS:  Mild bibasilar atelectasis. Trace pleural effusions. Mild bibasilar  interstitial edema. Small calcified granuloma left lower lobe. The  central airways are clear without bronchiectasis.      MEDIASTINUM and KYLE, LOWER NECK AND AXILLA:  The visualized thyroid gland is grossly unremarkable.      No thoracic lymphadenopathy by CT criteria.      Esophagus is not dilated.      HEART and VESSELS:  The heart is normal in gross morphology and size.      No significant pericardial effusion.      No thoracic aortic aneurysm      UPPER ABDOMEN:  The visualized subdiaphragmatic structures demonstrate no acute  findings on noncontrast images.      CHEST WALL and OSSEOUS STRUCTURES:  No suspicious osseous lesions. Multilevel degenerative changes of the  thoracic spine.          Impression: 1.  Mild bibasilar atelectasis.  2. Mild bibasilar interstitial edema and trace pleural effusions.          MACRO:  None.       Signed by: Jenaro Jang 3/13/2024 4:34 PM  Dictation workstation:   [id number]  MR brain w and wo IV contrast  Narrative: Interpreted By:  Anjelica Fountain,  and Zenon Espinal   STUDY:  MR BRAIN W AND WO IV CONTRAST; 3/13/2024 11:59 am      INDICATION:  Signs/Symptoms:r/o abscess.      COMPARISON:  CT of the head 03/12/2024.      ACCESSION NUMBER(S):  [id number]      ORDERING CLINICIAN:  TYRA LARSON      TECHNIQUE:  Axial T2, FLAIR, DWI, gradient echo T2 and T1 weighted images of the  brain were acquired. Postcontrast T1 weighted images were acquired  after administration of 19 mL Dotarem gadolinium based intravenous  contrast.      FINDINGS:  Images are somewhat degraded by motion.      CSF Spaces: Ventricles, sulci and basal cisterns are within normal  limits. No abnormal extra-axial collection.      Parenchyma: No restricted diffusion to suggest acute infarction. A  minimal burden of nonspecific T2 and FLAIR hyperintense signal is  scattered throughout the supratentorial white matter. T2 hyperintense  regions within the inferior basal ganglia are favored to represent  prominent perivascular spaces. No evidence of intracranial  hemorrhage. Slight bowing of the septum pellucidum to the left is  within normal limits. No mass effect or midline shift. No abnormal  enhancement.      Paranasal Sinuses and Mastoids: Visualized paranasal sinuses and  mastoid air cells are clear.      The major intracranial flow voids are patent. No abnormal filling  defect is identified within the dural venous sinuses.      Impression: No evidence of intracranial abscess.      Minimal white-matter changes are entirely non-specific and may be  seen with migraine headaches, chronic hypertension, small-vessel  ischemic disease, among others. No evidence of acute ischemic injury  or intracranial mass.      I personally reviewed the images/study and I agree with the findings  as stated by Dr. Yarbrough.      MACRO:  None      Signed by:  Anjelica Fountain 3/13/2024 12:56 PM  Dictation workstation:   [id number]  CT abdomen pelvis w IV contrast  Narrative: Interpreted By:  Jenaro Jang,   STUDY:  CT ABDOMEN PELVIS W IV CONTRAST;  3/13/2024 11:41 am      INDICATION:  Signs/Symptoms:r/o abscess.      COMPARISON:  None.      ACCESSION NUMBER(S):  [id number]      ORDERING CLINICIAN:  TYRA LARSON      TECHNIQUE:  CT of the abdomen and pelvis was performed.  Contiguous axial images  were obtained at 3 mm slice thickness through the abdomen and pelvis.  Coronal and sagittal reconstructions at 3 mm slice thickness were  performed. 75 mL Omnipaque 350 administered intravenously without  immediate complication.      FINDINGS:  LOWER CHEST:  Bibasilar atelectasis.      ABDOMEN:      LIVER:  Unremarkable      BILE DUCTS:  Not dilated.      GALLBLADDER:  No calcified stone or definite inflammation.      PANCREAS:  Unremarkable      SPLEEN:  Enlarged measuring 14.4 cm in length.      ADRENAL GLANDS:  Unremarkable      KIDNEYS AND URETERS:  Small cyst in the mid right kidney. Otherwise unremarkable kidneys  without hydronephrosis.      PELVIS:      BLADDER:  Nondistended.      REPRODUCTIVE ORGANS:  No visualized acute abnormalities.      BOWEL:  Perirectal stranding is noted. There may be some rectal wall  thickening as well as adjacent prominent lymph nodes measuring up to  9 mm short axis. No findings of bowel obstruction otherwise.  Unremarkable appendix. Unremarkable mesentery.          VESSELS:  Aortoiliac system is patent without aneurysm.  Major visceral  branches are patent. IVC and major branches are grossly patent.  Major portal venous branches are patent.      PERITONEUM AND RETROPERITONEUM:  No free fluid or free air.   Prominent perirectal lymph nodes. No  abdominal lymphadenopathy.      BONE AND ABDOMINAL WALL:  No acute skeletal findings.   Degenerative changes of the spine.          Impression: Mild rectal wall thickening with adjacent fat stranding  and prominent  lymph nodes suggestive of proctitis although underlying mass should  be excluded. Suggest clinical correlation and nonemergent direct  visualization to exclude neoplasm. No abscess otherwise identified.      MACRO:  None.      Signed by: Jenaro Jang 3/13/2024 12:14 PM  Dictation workstation:   [id number]      Physical Exam  General: Patient does not appear to be in any acute distress, alert, awake  Head: Normocephalic, Atraumatic   Eyes: Normal external exam, EOMI  ENT: Normal external inspection of ears and nose. Thrush in posterior oropharynx. Bite geo on inner cheek. Cervical LAD  Cardiovascular: RRR, S1/S2, no murmurs, rubs, or gallops, radial pulses +2, no edema of extremities  Pulmonary: CTAB, no respiratory distress.  Abdomen: soft, non-tender, nondistended  MSK: No joint swelling, normal movements of all extremities  Neuro: No focal neuro deficits  Skin- Warm. Dry. No lesions, contusions, or erythema.  Relevant Results               Assessment/Plan        Assessment:  1) Acute Metabolic Encephalopathy 2/2 Acute HIV infection  2) Hx of Syphilis  3) Hyponatremia  4) Transaminitis     Plan:  1) Tolerated Biktarvy, will send with 1 week of Biktarvy. Set up follow up at HIV clinic at [facility] next week for continued management.  2) CD4 count 545, no additional prophylaxis needed  3) Follow up HIV genotype in order to adjust ART for any possible resistance  3) Syphilis screening positive, RPR negative, CSF VDRL negative. Lab results consistent with previously treated syphilis. 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 At this time, patient does not need penicillin for latent syphilis   4) Discussed with patient and wife that she will need to be tested for HIV     Case seen and discussed with Dr. Sanjay England DO  PGY-2 Internal Medicine

## 2024-03-15 NOTE — DISCHARGE SUMMARY
Discharge Diagnosis  Symptomatic HIV infection (CMS/HCC)    Issues Requiring Follow-Up  HIV    Patient will need to follow up with his primary care provider within 7 days for hospital follow-up.  Additionally will need to follow up with [facility] ([facility]) as soon as possible for recent lab tests.     Biktarvy 1 tablet daily was newly prescribed, but no other medication changes.    Discharge Meds     Your medication list        START taking these medications        Instructions Last Dose Given Next Dose Due   Biktarvy -25 mg tablet  Generic drug: gafphzghb-unszcztt-eduwfcf ala      Take 1 tablet by mouth once daily.              CONTINUE taking these medications        Instructions Last Dose Given Next Dose Due   ibuprofen 800 mg tablet      Take 1 tablet (800 mg) by mouth 3 times a day as needed for mild pain (1 - 3) (pain).       lisdexamfetamine 40 mg capsule  Commonly known as: Vyvanse      Take 1 capsule (40 mg) by mouth once daily.       ondansetron ODT 8 mg disintegrating tablet  Commonly known as: Zofran-ODT      Take 1 tablet (8 mg) by mouth every 8 hours if needed for nausea or vomiting for up to 7 days.       tadalafil 10 mg tablet  Commonly known as: Cialis      Take 1 tablet (10 mg) by mouth once daily. Before activity as needed              ASK your doctor about these medications        Instructions Last Dose Given Next Dose Due   mometasone 50 mcg/actuation nasal spray  Commonly known as: Nasonex      FOR DIRECTIONS ON HOW TO   TAKE THIS MEDICATION, READ THE [facility]  INVOICE/RECEIPT                 Where to Get Your Medications        These medications were sent to [facility]  [address] [place] [address], [address], [place] [address]      Hours: 8:30 AM to 5 PM Mon-Fri, 9 AM to 1 PM Sat Phone: [phone]   Biktarvy -25 mg tablet         Test Results Pending At Discharge  Pending Labs       Order Current Status     Syphilis Screening With TPPA Reflex In process     DNA HLA- In process    Fungitell Beta-D Glucan Serum In process    HIV Genotype Assay In process    Phospholipase A2 Receptor Ab IgG with Reflex to Titer In process    Serum Protein Electrophoresis + Immunofixation In process    Serum Protein Electrophoresis + Immunofixation In process    T-Spot TB In process    Urine Protein Electrophoresis + Immunofixation In process    Urine Protein Electrophoresis + Immunofixation In process    Blood Culture Preliminary result    Blood Culture Preliminary result    CSF Culture/Smear Preliminary result          Hospital Course  Patient is a 52 y/o M with PMHx significant for ADHD on Vyvanse who initially presented to the hospital with persistent headache. Upon initial evaluation, the patient was intermittently febrile without resolution with Tylenol, but was otherwise hemodynamically stable. Initial labs were significant for hyponatremia with sodium 125, leukopenia with WBC 2.3, and thrombocytopenia with platelets 85. There was concern for meningitis vs. CNS infection and an LP was performed showing protein 55, glucose 69, lymphocyte 1, LD 25. Additional PCR studies of CSF including HSV, HHV6, Ecoli, Herpes Simplex 1 and 2, Parechovirus, Varicella, Haemophilus, listeria, neisseria, strep agalactiae and pneumoniae, CMV, enterovirus, cryptococcus were all negative. CT head was negative for acute intracranial abnormality. Given unclear etiology of symptoms, the patient was admitted to the general medicine service for further evaluation. Suspected that this was metabolic encephalopathy secondary to possible aseptic meningitis vs. Acute viral infection vs. Symptomatic hyponatremia. During admission MRI brain and CT abdomen were both ordered to assess for possible abscess or other abnormality, but were negative. HIV screen was initially reactive, but reflex confirmation antigen was negative. Syphilis screen was reactive, but RPR and CSF VDRL were negative. Neurology was  consulted, but had low suspicion for secondary CNS infection, RCVS, CVST, stroke, meningitis/encephalitis, then signed off. ID was consulted and ordered HIV PCR, CD4/8, CSF VDRL, TB spot, Fungitell.  Unfortunately, HIV RNA PCR resulted in >10,000,000 detected. The patient was started on Biktarvy and pneumovax 23 vaccine was ordered. The patient remained hemodynamically stable and was then deemed stable for discharge from the hospital on Biktarvy. Very strict instructions to follow up with the [facility] as soon as possible, and with his primary care provider for a hospital follow up. Patient was counseled regarding safe sex and the risks of transmission.    Pertinent Physical Exam At Time of Discharge  Physical Exam  Constitutional:       General: He is not in acute distress.     Appearance: Normal appearance. He is not toxic-appearing.   HENT:      Head: Normocephalic and atraumatic.      Comments: No obvious dental abnormality     Mouth/Throat:      Mouth: Mucous membranes are moist.      Pharynx: No posterior oropharyngeal erythema.   Eyes:      General: No scleral icterus.     Extraocular Movements: Extraocular movements intact.   Cardiovascular:      Rate and Rhythm: Normal rate and regular rhythm.      Heart sounds: No murmur heard.     No friction rub. No gallop.   Pulmonary:      Effort: Pulmonary effort is normal. No respiratory distress.      Breath sounds: No wheezing, rhonchi or rales.   Abdominal:      General: Abdomen is flat. There is no distension.      Palpations: Abdomen is soft.      Tenderness: There is no abdominal tenderness. There is no guarding.   Musculoskeletal:         General: No swelling or tenderness.      Cervical back: Neck supple. No tenderness.   Lymphadenopathy:      Cervical: Cervical adenopathy (Right anterior chain) present.   Skin:     General: Skin is warm and dry.      Comments: No obvious lesions or discoloration   Neurological:      General: No  focal deficit present.      Mental Status: He is alert and oriented to person, place, and time.      Motor: No weakness.   Psychiatric:         Mood and Affect: Mood normal.     Outpatient Follow-Up  No future appointments.    The discharge summary was discussed with the senior resident and attending physician,  Maico Esparza,  PGY-1

## 2024-03-15 NOTE — NURSING NOTE
Pt discharged to home. Wife at bedside at the time of discharge. This RN attempted to review education dn follow up plan but both the patient and the wife stated full understanding. This RN then stressed the importance for close follow up and medication compliance. IV removed. Pts wife packed belongings and patient requested to walk off unit when dressed.     India Beatty, RN

## 2024-03-16 LAB — DNA HLA-B5701: NEGATIVE

## 2024-03-17 LAB
BACTERIA BLD CULT: NORMAL
BACTERIA BLD CULT: NORMAL

## 2024-03-18 ENCOUNTER — PATIENT OUTREACH (OUTPATIENT)
Dept: PRIMARY CARE | Facility: CLINIC | Age: 54
End: 2024-03-18
Payer: COMMERCIAL

## 2024-03-18 LAB
ALBUMIN MFR UR ELPH: 74 %
ALBUMIN: 3.2 G/DL (ref 3.4–5)
ALPHA 1 GLOBULIN: 0.2 G/DL (ref 0.2–0.6)
ALPHA 2 GLOBULIN: 0.7 G/DL (ref 0.4–1.1)
ALPHA1 GLOB MFR UR ELPH: 3.8 %
ALPHA2 GLOB MFR UR ELPH: 5.9 %
B-GLOBULIN MFR UR ELPH: 8.9 %
BETA GLOBULIN: 0.5 G/DL (ref 0.5–1.2)
GAMMA GLOB MFR UR ELPH: 7.4 %
GAMMA GLOBULIN: 0.7 G/DL (ref 0.5–1.4)
IMMUNOFIXATION COMMENT: ABNORMAL
IMMUNOFIXATION COMMENT: NORMAL
PATH REVIEW - SERUM IMMUNOFIXATION: ABNORMAL
PATH REVIEW - URINE IMMUNOFIXATION: NORMAL
PATH REVIEW-SERUM PROTEIN ELECTROPHORESIS: ABNORMAL
PATH REVIEW-URINE PROTEIN ELECTROPHORESIS: NORMAL
PROTEIN ELECTROPHORESIS COMMENT: ABNORMAL
URINE ELECTROPHORESIS COMMENT: NORMAL

## 2024-03-18 NOTE — PROGRESS NOTES
Discharge Facility: [facility]  Discharge Diagnosis: HIV disease  Admission Date: 3/12/2024  Discharge Date:  3/15/2024    PCP Appointment Date: none-task  Specialist Appointment Date: [facility]- 3/21/2024  Hospital Encounter and Summary: Linked   See discharge assessment below for further details    Issues Requiring Follow-Up  HIV     Patient will need to follow up with his primary care provider within 7 days for hospital follow-up.  Additionally will need to follow up with [facility] ([facility]) as soon as possible for recent lab tests.     Biktarvy 1 tablet daily was newly prescribed, but no other medication changes.    Engagement  Call Start Time: 0945 (3/18/2024  9:45 AM)    Medications  Medications reviewed with patient/caregiver?: Yes (3/18/2024  9:45 AM)  Is the patient having any side effects they believe may be caused by any medication additions or changes?: No (3/18/2024  9:45 AM)  Does the patient have all medications ordered at discharge?: Yes (3/18/2024  9:45 AM)  Care Management Interventions: No intervention needed (3/18/2024  9:45 AM)  Prescription Comments: new med:Biktarvy (3/18/2024  9:45 AM)  Is the patient taking all medications as directed (includes completed medication regime)?: Yes (3/18/2024  9:45 AM)  Medication Comments: see med list (3/18/2024  9:45 AM)    Appointments  Does the patient have a primary care provider?: Yes (3/18/2024  9:45 AM)  Care Management Interventions: Advised patient to make appointment (3/18/2024  9:45 AM)  Has the patient kept scheduled appointments due by today?: Yes (3/18/2024  9:45 AM)  Care Management Interventions: Advised patient to keep appointment (3/18/2024  9:45 AM)    Self Management  What is the home health agency?: none (3/18/2024  9:45 AM)  Has home health visited the patient within 72 hours of discharge?: Not applicable (3/18/2024  9:45 AM)    Patient Teaching  Does the patient have access to their discharge instructions?: Yes (3/18/2024  9:45 AM)  Care  Management Interventions: Reviewed instructions with patient (3/18/2024  9:45 AM)  What is the patient's perception of their health status since discharge?: Improving (3/18/2024  9:45 AM)  Is the patient/caregiver able to teach back the hierarchy of who to call/visit for symptoms/problems? PCP, Specialist, Home Health nurse, Urgent Care, ED, 911: Yes (3/18/2024  9:45 AM)    Wrap Up  Wrap Up Additional Comments: CTS spoke with patient. He was admitted to [facility] on 3/12/2024 with HIV infection. Discharged on 3/15/2024 with new med Biktarvy. Patient stated that he is doing ok. He does have an appt with Dr Timmy Cash at the [facility] on 3/21/2024 and he is aware of that appt. He does not feel he needs an appt with PCP at this time. No issues with obtaining the new medication. Patient had no questions or concerns at this time. (3/18/2024  9:45 AM)  Call End Time: 0950 (3/18/2024  9:45 AM)

## 2024-03-18 NOTE — TELEPHONE ENCOUNTER
Time Spent: 5 mins   Pt reached out to EIS   Pt requested to receive medical excuse note to return to work   EIS forwarded request to RN (Yessica Langston)  Pt confirmed NPV.   EIS will complete RW at NPV.   EIS will complete EIS Plan.   EIS will f/U to engage in care.

## 2024-03-19 LAB
BACTERIA CSF CULT: NORMAL
GRAM STN SPEC: NORMAL
GRAM STN SPEC: NORMAL

## 2024-03-20 LAB
ELECTRONICALLY SIGNED BY: NORMAL
HIV GENOTYPE RESULTS: NORMAL

## 2024-03-20 NOTE — TELEPHONE ENCOUNTER
Time Spent: 5 mins   EIS reached out to pt. Pt available to answer call.  Pt confirmed NPV.   Pt requested to opt-out of reminder calls from EIS. EIS accepted request.   Pt opted out of meeting with EIS during NPV.   EIS will f/U with RN if pt would like to complete RW.   EIS will communicate with pt care team to stay updated on new dx care plan.

## 2024-03-21 ENCOUNTER — CLINICAL SUPPORT (OUTPATIENT)
Dept: IMMUNOLOGY | Facility: CLINIC | Age: 54
End: 2024-03-21
Payer: COMMERCIAL

## 2024-03-21 ENCOUNTER — DOCUMENTATION (OUTPATIENT)
Dept: IMMUNOLOGY | Facility: CLINIC | Age: 54
End: 2024-03-21
Payer: COMMERCIAL

## 2024-03-21 DIAGNOSIS — B20 SYMPTOMATIC HIV INFECTION (MULTI): ICD-10-CM

## 2024-03-21 DIAGNOSIS — A53.0 SYPHILI, LATENT: ICD-10-CM

## 2024-03-21 LAB
C TRACH RRNA SPEC QL NAA+PROBE: NEGATIVE
HBV SURFACE AB SER-ACNC: <3.1 MIU/ML
N GONORRHOEA DNA SPEC QL PROBE+SIG AMP: NEGATIVE
T GONDII IGG SER-ACNC: NONREACTIVE

## 2024-03-21 PROCEDURE — 86706 HEP B SURFACE ANTIBODY: CPT

## 2024-03-21 PROCEDURE — 82960 TEST FOR G6PD ENZYME: CPT

## 2024-03-21 PROCEDURE — 96372 THER/PROPH/DIAG INJ SC/IM: CPT | Performed by: FAMILY MEDICINE

## 2024-03-21 PROCEDURE — 86777 TOXOPLASMA ANTIBODY: CPT

## 2024-03-21 PROCEDURE — 2500000004 HC RX 250 GENERAL PHARMACY W/ HCPCS (ALT 636 FOR OP/ED): Mod: JZ | Performed by: FAMILY MEDICINE

## 2024-03-21 PROCEDURE — 36415 COLL VENOUS BLD VENIPUNCTURE: CPT

## 2024-03-21 PROCEDURE — 87800 DETECT AGNT MULT DNA DIREC: CPT

## 2024-03-21 RX ADMIN — PENICILLIN G BENZATHINE 2.4 MILLION UNITS: 2400000 INJECTION, SUSPENSION INTRAMUSCULAR at 12:38

## 2024-03-21 NOTE — PROGRESS NOTES
Time spent: 15 mins  EIS: Newly Diagnosed    Sw meets Pt at his RN NPV. Pt recently Dx 3/14 during hospital admission. Sw provides Pt with their contact information and explains role of Sw in [facility]. RN advised that Pt had co-pay for ART, and Sw assisted Pt with co-pay coupon card to present to pharmacy. Sw and Pt discuss [place] financial guidelines, and per Pt report he and his wife are over income threshold. Pt is currently not interested in other follow up services through EIS staff. Pt would prefer that Sw provide follow up at [facility], no current interest in phone calls - scheduled to see Dr. Seymour on 4/26. Pt aware that he can contact this Sw and/or RN at any time with questions or concerns. Sw will continue to follow.

## 2024-03-21 NOTE — PROGRESS NOTES
Pt here for intake with RN after recent dx with HIV at [facility].  Shared dx with his wife who is here with him today.  Pt was started on Biktarvy and is tolerating well.  Given first dose of 3 bicillins for tx of syphilis-will be back next Friday for #2.  Pt also met with MILADY Perera.  Appointment made with Dr. Seymour for April.

## 2024-03-22 LAB — G6PD RBC QL: NORMAL

## 2024-03-26 ENCOUNTER — OFFICE VISIT (OUTPATIENT)
Dept: PRIMARY CARE | Facility: CLINIC | Age: 54
End: 2024-03-26
Payer: COMMERCIAL

## 2024-03-26 VITALS
SYSTOLIC BLOOD PRESSURE: 128 MMHG | WEIGHT: 203 LBS | HEART RATE: 84 BPM | RESPIRATION RATE: 18 BRPM | HEIGHT: 72 IN | BODY MASS INDEX: 27.5 KG/M2 | OXYGEN SATURATION: 95 % | TEMPERATURE: 98 F | DIASTOLIC BLOOD PRESSURE: 74 MMHG

## 2024-03-26 DIAGNOSIS — D69.6 THROMBOCYTOPENIA (CMS-HCC): Primary | ICD-10-CM

## 2024-03-26 DIAGNOSIS — F98.8 ATTENTION DEFICIT DISORDER, UNSPECIFIED HYPERACTIVITY PRESENCE: ICD-10-CM

## 2024-03-26 DIAGNOSIS — B20 SYMPTOMATIC HIV INFECTION (MULTI): ICD-10-CM

## 2024-03-26 PROCEDURE — 80324 DRUG SCREEN AMPHETAMINES 1/2: CPT

## 2024-03-26 PROCEDURE — 99213 OFFICE O/P EST LOW 20 MIN: CPT | Performed by: FAMILY MEDICINE

## 2024-03-26 PROCEDURE — 80307 DRUG TEST PRSMV CHEM ANLYZR: CPT

## 2024-03-26 ASSESSMENT — ENCOUNTER SYMPTOMS
TROUBLE SWALLOWING: 0
MUSCULOSKELETAL NEGATIVE: 1
ABDOMINAL PAIN: 0
CHEST TIGHTNESS: 0
FEVER: 0
DIZZINESS: 0
PALPITATIONS: 0
HEMATOLOGIC/LYMPHATIC NEGATIVE: 1
SHORTNESS OF BREATH: 0
CHILLS: 0
ENDOCRINE NEGATIVE: 1
CARDIOVASCULAR NEGATIVE: 1
HEADACHES: 0
SORE THROAT: 0
FATIGUE: 0

## 2024-03-26 NOTE — PROGRESS NOTES
Subjective   Patient ID: Josue Quintanilla is a 53 y.o. male who presents for Hospital Follow-up (Patient was in [facility] on 3/12/2024 due to illness with high fever, headache. Was tested for COVID, results were negative. Patient received antibiotics through an IV. Advised patient follow up with primary. ).    HPI   OARRS:  Darion Washington DO on 3/27/2024  6:52 AM  I have personally reviewed the OARRS report for Josue Quintanilla. I have considered the risks of abuse, dependence, addiction and diversion    Is the patient prescribed a combination of a benzodiazepine and opioid?  No    Last Urine Drug Screen / ordered today: Yes  Recent Results (from the past 8760 hour(s))   Drug Screen, Urine With Reflex to Confirmation    Collection Time: 03/26/24  4:13 PM   Result Value Ref Range    Amphetamine Screen, Urine Presumptive Positive (A) Presumptive Negative    Barbiturate Screen, Urine Presumptive Negative Presumptive Negative    Benzodiazepines Screen, Urine Presumptive Negative Presumptive Negative    Cannabinoid Screen, Urine Presumptive Negative Presumptive Negative    Cocaine Metabolite Screen, Urine Presumptive Negative Presumptive Negative    Fentanyl Screen, Urine Presumptive Negative Presumptive Negative    Opiate Screen, Urine Presumptive Negative Presumptive Negative    Oxycodone Screen, Urine Presumptive Negative Presumptive Negative    PCP Screen, Urine Presumptive Negative Presumptive Negative    Methadone Screen, Urine Presumptive Negative Presumptive Negative   Drug Screen, Urine    Collection Time: 03/13/24  1:47 PM   Result Value Ref Range    Amphetamine Screen, Urine Presumptive Negative Presumptive Negative    Barbiturate Screen, Urine Presumptive Negative Presumptive Negative    Benzodiazepines Screen, Urine Presumptive Negative Presumptive Negative    Cannabinoid Screen, Urine Presumptive Negative Presumptive Negative    Cocaine Metabolite Screen, Urine Presumptive Negative Presumptive Negative     Fentanyl Screen, Urine Presumptive Negative Presumptive Negative    Opiate Screen, Urine Presumptive Negative Presumptive Negative    Oxycodone Screen, Urine Presumptive Negative Presumptive Negative    PCP Screen, Urine Presumptive Negative Presumptive Negative    Methadone Screen, Urine Presumptive Negative Presumptive Negative   Amphetamine Confirm, Urine    Collection Time: 04/04/23  2:24 PM   Result Value Ref Range    Methamphetamine Quant, Ur <200 ng/mL    MDA, Urine <200 ng/mL    MDEA, Urine <200 ng/mL    Phentermine,Urine <200 ng/mL    Amphetamines,Urine 3878 ng/mL    MDMA, Urine <200 ng/mL     Results are as expected.         Controlled Substance Agreement:  Date of the Last Agreement: 4/23  Reviewed Controlled Substance Agreement including but not limited to the benefits, risks, and alternatives to treatment with a Controlled Substance medication(s).    Stimulants:   What is the patient's goal of therapy? Help with concentration  Is this being achieved with current treatment? yes    Activities of Daily Living:   Is your overall impression that this patient is benefiting (symptom reduction outweighs side effects) from stimulant therapy? Yes     1. Physical Functioning: Better  2. Family Relationship: Better  3. Social Relationship: Better  4. Mood: Better  5. Sleep Patterns: Better  6. Overall Function: Better    Review of Systems   Constitutional: Negative.  Negative for appetite change, chills, fatigue and fever.   HENT: Negative.  Negative for sore throat and trouble swallowing.    Respiratory:  Negative for chest tightness and shortness of breath.    Cardiovascular: Negative.  Negative for chest pain, palpitations and leg swelling.   Gastrointestinal:  Negative for abdominal pain.   Endocrine: Negative.    Musculoskeletal: Negative.    Skin: Negative.  Negative for rash.   Neurological:  Negative for dizziness, tremors and headaches.   Hematological: Negative.    Psychiatric/Behavioral:  Positive for  decreased concentration. Negative for agitation. The patient is not nervous/anxious and is not hyperactive.         No Insomnia       Objective   /74   Pulse 84   Temp 36.7 °C (98 °F)   Resp 18   Ht 1.829 m (6')   Wt 92.1 kg (203 lb)   SpO2 95%   BMI 27.53 kg/m²     Physical Exam  Constitutional:       Appearance: Normal appearance.   HENT:      Head: Normocephalic and atraumatic.      Right Ear: Tympanic membrane, ear canal and external ear normal.      Left Ear: Tympanic membrane, ear canal and external ear normal.      Nose: Nose normal.      Mouth/Throat:      Mouth: Mucous membranes are moist.      Pharynx: Oropharynx is clear.   Eyes:      Conjunctiva/sclera: Conjunctivae normal.   Cardiovascular:      Rate and Rhythm: Normal rate and regular rhythm.   Pulmonary:      Effort: Pulmonary effort is normal.      Breath sounds: Normal breath sounds.   Musculoskeletal:         General: Normal range of motion.      Cervical back: Neck supple.   Skin:     General: Skin is warm and dry.   Neurological:      General: No focal deficit present.      Mental Status: He is alert and oriented to person, place, and time.   Psychiatric:         Attention and Perception: He is inattentive.         Mood and Affect: Mood normal.         Speech: Speech normal.         Behavior: Behavior normal. Behavior is cooperative.         Cognition and Memory: Cognition normal.         Assessment/Plan   Problem List Items Addressed This Visit             ICD-10-CM    Thrombocytopenia (CMS/HCC) - Primary D69.6     Pt with recent infection, Pt seeing ID and following         Symptomatic HIV infection (CMS/HCC) B20     Pt is seeing ID, recent DX on antivirals          Other Visit Diagnoses         Codes    Attention deficit disorder, unspecified hyperactivity presence     F98.8    Relevant Orders    Drug Screen, Urine With Reflex to Confirmation (Completed)    Amphetamine Confirm, Urine

## 2024-03-27 LAB
AMPHETAMINES UR QL SCN: ABNORMAL
BARBITURATES UR QL SCN: ABNORMAL
BENZODIAZ UR QL SCN: ABNORMAL
BZE UR QL SCN: ABNORMAL
CANNABINOIDS UR QL SCN: ABNORMAL
FENTANYL+NORFENTANYL UR QL SCN: ABNORMAL
METHADONE UR QL SCN: ABNORMAL
OPIATES UR QL SCN: ABNORMAL
OXYCODONE+OXYMORPHONE UR QL SCN: ABNORMAL
PCP UR QL SCN: ABNORMAL

## 2024-03-27 ASSESSMENT — ENCOUNTER SYMPTOMS
DECREASED CONCENTRATION: 1
CONSTITUTIONAL NEGATIVE: 1
HYPERACTIVE: 0
TREMORS: 0
NERVOUS/ANXIOUS: 0
AGITATION: 0
APPETITE CHANGE: 0

## 2024-03-29 ENCOUNTER — PATIENT OUTREACH (OUTPATIENT)
Dept: PRIMARY CARE | Facility: CLINIC | Age: 54
End: 2024-03-29
Payer: COMMERCIAL

## 2024-03-29 ENCOUNTER — CLINICAL SUPPORT (OUTPATIENT)
Dept: IMMUNOLOGY | Facility: CLINIC | Age: 54
End: 2024-03-29
Payer: COMMERCIAL

## 2024-03-29 DIAGNOSIS — A53.0 SYPHILI, LATENT: ICD-10-CM

## 2024-03-29 PROCEDURE — 96372 THER/PROPH/DIAG INJ SC/IM: CPT | Performed by: FAMILY MEDICINE

## 2024-03-29 PROCEDURE — 2500000004 HC RX 250 GENERAL PHARMACY W/ HCPCS (ALT 636 FOR OP/ED): Mod: JZ | Performed by: FAMILY MEDICINE

## 2024-03-29 RX ADMIN — PENICILLIN G BENZATHINE 2.4 MILLION UNITS: 2400000 INJECTION, SUSPENSION INTRAMUSCULAR at 16:16

## 2024-03-29 NOTE — PROGRESS NOTES
Pt received 2.4 mu of bicillin in his left hip without incident.  He will be back next Friday for number 3.

## 2024-03-29 NOTE — PROGRESS NOTES
Call regarding appt. with PCP on 3/26/2024 after hospitalization.  At time of outreach call the patient feels as if their condition has improved since last visit.  Reviewed the PCP appointment with the pt and addressed any questions or concerns.

## 2024-03-30 LAB
AMPHET UR-MCNC: 2603 NG/ML
MDA UR-MCNC: <200 NG/ML
MDEA UR-MCNC: <200 NG/ML
MDMA UR-MCNC: <200 NG/ML
METHAMPHET UR-MCNC: <200 NG/ML
PHENTERMINE UR CFM-MCNC: <200 NG/ML

## 2024-04-05 ENCOUNTER — CLINICAL SUPPORT (OUTPATIENT)
Dept: IMMUNOLOGY | Facility: CLINIC | Age: 54
End: 2024-04-05
Payer: COMMERCIAL

## 2024-04-05 DIAGNOSIS — A53.0 SYPHILI, LATENT: ICD-10-CM

## 2024-04-05 PROCEDURE — 96372 THER/PROPH/DIAG INJ SC/IM: CPT | Performed by: FAMILY MEDICINE

## 2024-04-05 PROCEDURE — 2500000004 HC RX 250 GENERAL PHARMACY W/ HCPCS (ALT 636 FOR OP/ED): Mod: JZ | Performed by: FAMILY MEDICINE

## 2024-04-05 RX ADMIN — PENICILLIN G BENZATHINE 2.4 MILLION UNITS: 2400000 INJECTION, SUSPENSION INTRAMUSCULAR at 16:08

## 2024-04-05 NOTE — PROGRESS NOTES
Pt here for 3rd and last bicillin injection.  Given in right ventrogluteal.  Pt tolerated well and was reminded of his next visit with Dr. Seymour.

## 2024-04-10 ENCOUNTER — TELEPHONE (OUTPATIENT)
Dept: PRIMARY CARE | Facility: CLINIC | Age: 54
End: 2024-04-10
Payer: COMMERCIAL

## 2024-04-10 DIAGNOSIS — R53.83 FATIGUE, UNSPECIFIED TYPE: Primary | ICD-10-CM

## 2024-04-10 DIAGNOSIS — B20 SYMPTOMATIC HIV INFECTION (MULTI): ICD-10-CM

## 2024-04-10 RX ORDER — BICTEGRAVIR SODIUM, EMTRICITABINE, AND TENOFOVIR ALAFENAMIDE FUMARATE 50; 200; 25 MG/1; MG/1; MG/1
1 TABLET ORAL DAILY
Qty: 30 TABLET | Refills: 3 | Status: SHIPPED | OUTPATIENT
Start: 2024-04-10

## 2024-04-10 RX ORDER — BUPROPION HYDROCHLORIDE 150 MG/1
150 TABLET, EXTENDED RELEASE ORAL 2 TIMES DAILY
Qty: 60 TABLET | Refills: 3 | Status: SHIPPED | OUTPATIENT
Start: 2024-04-10 | End: 2024-08-08

## 2024-04-10 NOTE — TELEPHONE ENCOUNTER
Jo Ann stated Josue is trying to quit smoking cold turkey but is having a hard time.  Can you call in Wellbutrin for him?    Please advise.    [facility]

## 2024-04-10 NOTE — TELEPHONE ENCOUNTER
Time Spent: 15 mins   Pt reached out to EIS via phone call   Pt requested refill  Pt reports having 5 days of medication left.   EIS forwarded request to RN (Yessica Langston) and MILADY (Lana Pa)  RN updated EIS. Pt currently ineligible for RW at [facility] as current income is over 500% [employer].     Pt has no further questions for EIS at the time.

## 2024-04-22 DIAGNOSIS — R41.840 DIFFICULTY CONCENTRATING: ICD-10-CM

## 2024-04-22 RX ORDER — LISDEXAMFETAMINE DIMESYLATE 40 MG/1
40 CAPSULE ORAL DAILY
Qty: 90 CAPSULE | Refills: 0 | Status: SHIPPED | OUTPATIENT
Start: 2024-04-22

## 2024-04-22 NOTE — TELEPHONE ENCOUNTER
Patient requests prescription below    Last Office Visit: 3/26/2024   Next Office Visit: 6/18/2024     Requested Prescriptions     Pending Prescriptions Disp Refills    lisdexamfetamine (Vyvanse) 40 mg capsule 90 capsule 0     Sig: Take 1 capsule (40 mg) by mouth once daily.

## 2024-04-25 NOTE — TELEPHONE ENCOUNTER
Time Spent: 5 mins   EIS reached out to pt on 04/22/2024.  Pt unable to answer call.   EIS left VM and text message.  EIS reached out as [employer] Guidelines increased for 2024.   EIS requested pt to notify EIS if current income is over or under new eligibility guidelines.   EIS forwarded request to RN (Yessica Langston)    PLAN:   EIS will complete RW if pt fits eligibility.

## 2024-04-26 ENCOUNTER — OFFICE VISIT (OUTPATIENT)
Dept: IMMUNOLOGY | Facility: CLINIC | Age: 54
End: 2024-04-26
Payer: COMMERCIAL

## 2024-04-26 ENCOUNTER — DOCUMENTATION (OUTPATIENT)
Dept: IMMUNOLOGY | Facility: CLINIC | Age: 54
End: 2024-04-26
Payer: COMMERCIAL

## 2024-04-26 VITALS
SYSTOLIC BLOOD PRESSURE: 129 MMHG | DIASTOLIC BLOOD PRESSURE: 86 MMHG | HEIGHT: 72 IN | HEART RATE: 77 BPM | TEMPERATURE: 98 F | OXYGEN SATURATION: 97 % | WEIGHT: 205.6 LBS | BODY MASS INDEX: 27.85 KG/M2

## 2024-04-26 DIAGNOSIS — F17.200 NICOTINE DEPENDENCE, UNCOMPLICATED, UNSPECIFIED NICOTINE PRODUCT TYPE: ICD-10-CM

## 2024-04-26 DIAGNOSIS — A53.0 SYPHILI, LATENT: ICD-10-CM

## 2024-04-26 DIAGNOSIS — B20 SYMPTOMATIC HIV INFECTION (MULTI): Primary | ICD-10-CM

## 2024-04-26 PROCEDURE — 87536 HIV-1 QUANT&REVRSE TRNSCRPJ: CPT | Performed by: FAMILY MEDICINE

## 2024-04-26 PROCEDURE — 87800 DETECT AGNT MULT DNA DIREC: CPT | Performed by: FAMILY MEDICINE

## 2024-04-26 PROCEDURE — 99204 OFFICE O/P NEW MOD 45 MIN: CPT | Performed by: FAMILY MEDICINE

## 2024-04-26 PROCEDURE — 36415 COLL VENOUS BLD VENIPUNCTURE: CPT | Performed by: FAMILY MEDICINE

## 2024-04-26 PROCEDURE — 99214 OFFICE O/P EST MOD 30 MIN: CPT | Performed by: FAMILY MEDICINE

## 2024-04-26 ASSESSMENT — PAIN SCALES - GENERAL: PAINLEVEL: 0-NO PAIN
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
## 2024-04-27 LAB
C TRACH RRNA SPEC QL NAA+PROBE: NEGATIVE
C TRACH RRNA SPEC QL NAA+PROBE: NEGATIVE
N GONORRHOEA DNA SPEC QL PROBE+SIG AMP: NEGATIVE
N GONORRHOEA DNA SPEC QL PROBE+SIG AMP: NEGATIVE

## 2024-04-28 LAB
HIV1 RNA # PLAS NAA DL=20: 87 COPIES/ML
HIV1 RNA # PLAS NAA DL=20: DETECTED {COPIES}/ML
HIV1 RNA SPEC NAA+PROBE-LOG#: 1.94 LOG10 CPY/ML

## 2024-04-30 ENCOUNTER — PATIENT OUTREACH (OUTPATIENT)
Dept: PRIMARY CARE | Facility: CLINIC | Age: 54
End: 2024-04-30
Payer: COMMERCIAL

## 2024-05-02 ENCOUNTER — TELEPHONE (OUTPATIENT)
Dept: PRIMARY CARE | Facility: CLINIC | Age: 54
End: 2024-05-02
Payer: COMMERCIAL

## 2024-05-30 ENCOUNTER — TELEPHONE (OUTPATIENT)
Dept: IMMUNOLOGY | Facility: CLINIC | Age: 54
End: 2024-05-30
Payer: COMMERCIAL

## 2024-05-30 ENCOUNTER — PATIENT OUTREACH (OUTPATIENT)
Dept: PRIMARY CARE | Facility: CLINIC | Age: 54
End: 2024-05-30
Payer: COMMERCIAL

## 2024-05-30 NOTE — TELEPHONE ENCOUNTER
Sw calls Pt to check in, leaves VM. Mohamud will plan to meet with Pt at upcoming MD appt on 6/14 if needed.

## 2024-06-13 NOTE — TELEPHONE ENCOUNTER
Time Spent: 0 mins   Pt texted EIS to confirm yearly income. Pt is not eligible for RW for 1004-3064 year.

## 2024-06-14 ENCOUNTER — OFFICE VISIT (OUTPATIENT)
Dept: IMMUNOLOGY | Facility: CLINIC | Age: 54
End: 2024-06-14
Payer: COMMERCIAL

## 2024-06-14 VITALS
WEIGHT: 209.4 LBS | DIASTOLIC BLOOD PRESSURE: 93 MMHG | OXYGEN SATURATION: 97 % | HEIGHT: 72 IN | TEMPERATURE: 98.1 F | RESPIRATION RATE: 16 BRPM | BODY MASS INDEX: 28.36 KG/M2 | SYSTOLIC BLOOD PRESSURE: 136 MMHG | HEART RATE: 85 BPM

## 2024-06-14 DIAGNOSIS — F17.200 NICOTINE DEPENDENCE, UNCOMPLICATED, UNSPECIFIED NICOTINE PRODUCT TYPE: ICD-10-CM

## 2024-06-14 DIAGNOSIS — A53.0 SYPHILI, LATENT: ICD-10-CM

## 2024-06-14 DIAGNOSIS — B20 SYMPTOMATIC HIV INFECTION (MULTI): Primary | ICD-10-CM

## 2024-06-14 LAB
ALBUMIN SERPL BCP-MCNC: 4.9 G/DL (ref 3.4–5)
ALP SERPL-CCNC: 75 U/L (ref 33–120)
ALT SERPL W P-5'-P-CCNC: 24 U/L (ref 10–52)
ANION GAP SERPL CALC-SCNC: 17 MMOL/L (ref 10–20)
AST SERPL W P-5'-P-CCNC: 23 U/L (ref 9–39)
BASOPHILS # BLD AUTO: 0.04 X10*3/UL (ref 0–0.1)
BASOPHILS NFR BLD AUTO: 0.7 %
BILIRUB SERPL-MCNC: 0.8 MG/DL (ref 0–1.2)
BUN SERPL-MCNC: 10 MG/DL (ref 6–23)
CALCIUM SERPL-MCNC: 9.5 MG/DL (ref 8.6–10.6)
CD3+CD4+ CELLS # BLD: 1.15 X10E9/L
CD3+CD4+ CELLS # BLD: 1145 /MM3
CD3+CD4+ CELLS NFR BLD: 53 %
CD3+CD4+ CELLS/CD3+CD8+ CLL BLD: 1.56 %
CD3+CD8+ CELLS # BLD: 0.73 X10E9/L
CD3+CD8+ CELLS NFR BLD: 34 %
CHLORIDE SERPL-SCNC: 101 MMOL/L (ref 98–107)
CO2 SERPL-SCNC: 28 MMOL/L (ref 21–32)
CREAT SERPL-MCNC: 1.1 MG/DL (ref 0.5–1.3)
EGFRCR SERPLBLD CKD-EPI 2021: 81 ML/MIN/1.73M*2
EOSINOPHIL # BLD AUTO: 0.03 X10*3/UL (ref 0–0.7)
EOSINOPHIL NFR BLD AUTO: 0.6 %
ERYTHROCYTE [DISTWIDTH] IN BLOOD BY AUTOMATED COUNT: 12.9 % (ref 11.5–14.5)
FLOW CYTOMETRY SPECIALIST REVIEW: ABNORMAL
GLUCOSE SERPL-MCNC: 101 MG/DL (ref 74–99)
HCT VFR BLD AUTO: 44.6 % (ref 41–52)
HGB BLD-MCNC: 15.2 G/DL (ref 13.5–17.5)
IMM GRANULOCYTES # BLD AUTO: 0.01 X10*3/UL (ref 0–0.7)
IMM GRANULOCYTES NFR BLD AUTO: 0.2 % (ref 0–0.9)
LYMPHOCYTES # BLD AUTO: 2.16 X10*3/UL (ref 1.2–4.8)
LYMPHOCYTES # SPEC AUTO: 2.16 X10*3/UL
LYMPHOCYTES NFR BLD AUTO: 40.2 %
MCH RBC QN AUTO: 28.8 PG (ref 26–34)
MCHC RBC AUTO-ENTMCNC: 34.1 G/DL (ref 32–36)
MCV RBC AUTO: 85 FL (ref 80–100)
MONOCYTES # BLD AUTO: 0.3 X10*3/UL (ref 0.1–1)
MONOCYTES NFR BLD AUTO: 5.6 %
NEUTROPHILS # BLD AUTO: 2.83 X10*3/UL (ref 1.2–7.7)
NEUTROPHILS NFR BLD AUTO: 52.7 %
NRBC BLD-RTO: 0 /100 WBCS (ref 0–0)
PLATELET # BLD AUTO: 261 X10*3/UL (ref 150–450)
POTASSIUM SERPL-SCNC: 4.5 MMOL/L (ref 3.5–5.3)
PROT SERPL-MCNC: 7.4 G/DL (ref 6.4–8.2)
RBC # BLD AUTO: 5.28 X10*6/UL (ref 4.5–5.9)
SODIUM SERPL-SCNC: 141 MMOL/L (ref 136–145)
WBC # BLD AUTO: 5.4 X10*3/UL (ref 4.4–11.3)

## 2024-06-14 PROCEDURE — 36415 COLL VENOUS BLD VENIPUNCTURE: CPT | Performed by: FAMILY MEDICINE

## 2024-06-14 PROCEDURE — 99214 OFFICE O/P EST MOD 30 MIN: CPT | Performed by: FAMILY MEDICINE

## 2024-06-14 PROCEDURE — 80053 COMPREHEN METABOLIC PANEL: CPT | Performed by: FAMILY MEDICINE

## 2024-06-14 PROCEDURE — 85025 COMPLETE CBC W/AUTO DIFF WBC: CPT | Performed by: FAMILY MEDICINE

## 2024-06-14 PROCEDURE — 88184 FLOWCYTOMETRY/ TC 1 MARKER: CPT | Mod: TC | Performed by: FAMILY MEDICINE

## 2024-06-14 PROCEDURE — 87536 HIV-1 QUANT&REVRSE TRNSCRPJ: CPT | Performed by: FAMILY MEDICINE

## 2024-06-14 ASSESSMENT — PAIN SCALES - GENERAL: PAINLEVEL: 0-NO PAIN

## 2024-06-14 NOTE — PROGRESS NOTES
"Subjective   Patient ID: Josue Quintanilla is a 54 y.o. who presents for follow-up.  HPI    53 yo here for follow-up    HIV  - Review of history: Was diagnosed in March when he presented to the hospital with fever and headache. LP was done and overall reassuring. HIV test done and positive, VL elevated at >10,000,000. CD4 545. On Biktarvy  - Last visit his VL improved to 87  - He is coping with diagnosis ok. Says \"I try not to think about it.\" Taking Biktarvy with no missed doses     Latent syphilis  - S/p treatment with PCN x 3    Nicotine dependence   - Stopped wellbutrin about a week ago due to concern about hair falling out  - Feels some cravings, feels a bit irritable. However has continued to avoid nicotine     Anxiety  - Job has been extremely aggravating   - Feels a bit on edge, especially after stopping nicotine  - Declines further resources for anxiety at this point    Review of Systems  10 point review of systems negative except as noted in HPI.    Objective     There were no vitals taken for this visit.  General: well appearing, no distress  Neck: No lymphadenopathy, no thyromegaly  CV: Regular rate and rhythm, no murmur  Lungs: Clear to auscultation bilaterally  Abdomen: Soft, nontender, nondistended  Extremities: No edema noted  Psych: Appropriate mood and affect     Assessment/Plan   53 yo here for follow-up    HIV  - Continue Biktarvy  - Check labs as ordered     Latent syphilis  - s/p treatment   - Will check Annually or sooner if new contacts    Anxiety  - Offered resources, will monitor    Tobacco use  - Offered other resources for helping with cravings, he declined at this time. Will monitor    RTC 3 months or sooner as needed  "

## 2024-06-14 NOTE — LETTER
06/14/24    Darion Washington DO  [address]  [facility] [address]  [facility] [address]      Dear Dr. Darion Washington DO,    I am writing to confirm that your patient, Josue Quintanilla, received care in my office on 06/14/24. I have enclosed a summary of the care provided to Josue for your reference.    Please contact me with any questions you may have regarding the visit.    Sincerely,         Lizeth Seymour MD  [address]  [facility] [address]  [facility] [address] [phone]    CC: No Recipients

## 2024-06-16 LAB
HIV1 RNA # PLAS NAA DL=20: NOT DETECTED {COPIES}/ML
HIV1 RNA SPEC NAA+PROBE-LOG#: NORMAL {LOG_COPIES}/ML

## 2024-06-18 ENCOUNTER — APPOINTMENT (OUTPATIENT)
Dept: PRIMARY CARE | Facility: CLINIC | Age: 54
End: 2024-06-18
Payer: COMMERCIAL

## 2024-07-15 ENCOUNTER — APPOINTMENT (OUTPATIENT)
Dept: PRIMARY CARE | Facility: CLINIC | Age: 54
End: 2024-07-15
Payer: COMMERCIAL

## 2024-07-15 VITALS
SYSTOLIC BLOOD PRESSURE: 142 MMHG | DIASTOLIC BLOOD PRESSURE: 84 MMHG | TEMPERATURE: 98 F | WEIGHT: 210.8 LBS | BODY MASS INDEX: 28.55 KG/M2 | HEART RATE: 71 BPM | HEIGHT: 72 IN | OXYGEN SATURATION: 97 % | RESPIRATION RATE: 18 BRPM

## 2024-07-15 DIAGNOSIS — Z00.00 WELL ADULT EXAM: ICD-10-CM

## 2024-07-15 DIAGNOSIS — R41.840 DIFFICULTY CONCENTRATING: ICD-10-CM

## 2024-07-15 DIAGNOSIS — H92.01 RIGHT EAR PAIN: Primary | ICD-10-CM

## 2024-07-15 PROCEDURE — 99213 OFFICE O/P EST LOW 20 MIN: CPT | Performed by: FAMILY MEDICINE

## 2024-07-15 RX ORDER — TADALAFIL 10 MG/1
10 TABLET ORAL DAILY PRN
Qty: 10 TABLET | Refills: 3 | Status: SHIPPED | OUTPATIENT
Start: 2024-07-15

## 2024-07-15 RX ORDER — LISDEXAMFETAMINE DIMESYLATE 40 MG/1
40 CAPSULE ORAL DAILY
Qty: 90 CAPSULE | Refills: 0 | Status: SHIPPED | OUTPATIENT
Start: 2024-07-15

## 2024-07-15 RX ORDER — PREDNISONE 20 MG/1
40 TABLET ORAL DAILY
Qty: 10 TABLET | Refills: 0 | Status: SHIPPED | OUTPATIENT
Start: 2024-07-15 | End: 2024-07-20

## 2024-07-15 NOTE — PROGRESS NOTES
Subjective   Patient ID: Josue Quintanilla is a 54 y.o. male who presents for Earache (Patient states he has been having a pain in his right ear.  Patient thinks it may be the nerve, but would like to get it checked out. ).    HPI   OARRS:  No data recorded  I have personally reviewed the OARRS report for Josue Quintanilla. I have considered the risks of abuse, dependence, addiction and diversion    Is the patient prescribed a combination of a benzodiazepine and opioid?  Yes, I feel it is clincially indicated to continue the medication and have discussed with the patient risks/benefits/alternatives.    Last Urine Drug Screen / ordered today: No  Recent Results (from the past 8760 hour(s))   Amphetamine Confirm, Urine    Collection Time: 03/26/24  4:13 PM   Result Value Ref Range    Methamphetamine Quant, Ur <200 ng/mL    MDA, Urine <200 ng/mL    MDEA, Urine <200 ng/mL    Phentermine,Urine <200 ng/mL    Amphetamines,Urine 2603 ng/mL    MDMA, Urine <200 ng/mL   Drug Screen, Urine With Reflex to Confirmation    Collection Time: 03/26/24  4:13 PM   Result Value Ref Range    Amphetamine Screen, Urine Presumptive Positive (A) Presumptive Negative    Barbiturate Screen, Urine Presumptive Negative Presumptive Negative    Benzodiazepines Screen, Urine Presumptive Negative Presumptive Negative    Cannabinoid Screen, Urine Presumptive Negative Presumptive Negative    Cocaine Metabolite Screen, Urine Presumptive Negative Presumptive Negative    Fentanyl Screen, Urine Presumptive Negative Presumptive Negative    Opiate Screen, Urine Presumptive Negative Presumptive Negative    Oxycodone Screen, Urine Presumptive Negative Presumptive Negative    PCP Screen, Urine Presumptive Negative Presumptive Negative    Methadone Screen, Urine Presumptive Negative Presumptive Negative   Drug Screen, Urine    Collection Time: 03/13/24  1:47 PM   Result Value Ref Range    Amphetamine Screen, Urine Presumptive Negative Presumptive Negative    Barbiturate  Screen, Urine Presumptive Negative Presumptive Negative    Benzodiazepines Screen, Urine Presumptive Negative Presumptive Negative    Cannabinoid Screen, Urine Presumptive Negative Presumptive Negative    Cocaine Metabolite Screen, Urine Presumptive Negative Presumptive Negative    Fentanyl Screen, Urine Presumptive Negative Presumptive Negative    Opiate Screen, Urine Presumptive Negative Presumptive Negative    Oxycodone Screen, Urine Presumptive Negative Presumptive Negative    PCP Screen, Urine Presumptive Negative Presumptive Negative    Methadone Screen, Urine Presumptive Negative Presumptive Negative     Results are as expected.         Controlled Substance Agreement:  Date of the Last Agreement: 3/24  Reviewed Controlled Substance Agreement including but not limited to the benefits, risks, and alternatives to treatment with a Controlled Substance medication(s).    Stimulants:   What is the patient's goal of therapy? Help with concentration  Is this being achieved with current treatment? yes    Activities of Daily Living:   Is your overall impression that this patient is benefiting (symptom reduction outweighs side effects) from stimulant therapy? Yes     1. Physical Functioning: Better  2. Family Relationship: Better  3. Social Relationship: Better  4. Mood: Better  5. Sleep Patterns: Better  6. Overall Function: Better    Review of Systems   Constitutional: Negative.  Negative for appetite change, fatigue and fever.   HENT:  Positive for ear pain. Negative for sore throat and trouble swallowing.    Respiratory:  Negative for chest tightness and shortness of breath.    Cardiovascular:  Negative for chest pain, palpitations and leg swelling.   Gastrointestinal:  Negative for abdominal pain.   Skin:  Negative for rash.   Neurological:  Negative for tremors and headaches.   Psychiatric/Behavioral:  Positive for decreased concentration. Negative for agitation. The patient is not nervous/anxious and is not  hyperactive.         No Insomnia       Objective   /84   Pulse 71   Temp 36.7 °C (98 °F)   Resp 18   Ht 1.829 m (6')   Wt 95.6 kg (210 lb 12.8 oz)   SpO2 97%   BMI 28.59 kg/m²     Physical Exam  Constitutional:       Appearance: Normal appearance.   HENT:      Head: Normocephalic and atraumatic.        Comments: tenderness  Eyes:      Conjunctiva/sclera: Conjunctivae normal.   Cardiovascular:      Rate and Rhythm: Normal rate and regular rhythm.      Heart sounds: Normal heart sounds.   Pulmonary:      Effort: Pulmonary effort is normal.      Breath sounds: Normal breath sounds.   Musculoskeletal:      Cervical back: Neck supple.   Skin:     General: Skin is warm and dry.   Neurological:      Mental Status: He is alert and oriented to person, place, and time.   Psychiatric:         Attention and Perception: He is inattentive.         Mood and Affect: Mood normal.         Speech: Speech normal.         Behavior: Behavior normal. Behavior is cooperative.         Cognition and Memory: Cognition normal.         Assessment/Plan   Problem List Items Addressed This Visit             ICD-10-CM    Difficulty concentrating R41.840    Relevant Medications    lisdexamfetamine (Vyvanse) 40 mg capsule     Other Visit Diagnoses         Codes    Right ear pain    -  Primary H92.01    Relevant Medications    predniSONE (Deltasone) 20 mg tablet    Well adult exam     Z00.00    Relevant Medications    tadalafil (Cialis) 10 mg tablet

## 2024-07-16 ASSESSMENT — ENCOUNTER SYMPTOMS
FEVER: 0
TREMORS: 0
HYPERACTIVE: 0
SHORTNESS OF BREATH: 0
AGITATION: 0
APPETITE CHANGE: 0
CONSTITUTIONAL NEGATIVE: 1
PALPITATIONS: 0
HEADACHES: 0
FATIGUE: 0
SORE THROAT: 0
DECREASED CONCENTRATION: 1
ABDOMINAL PAIN: 0
CHEST TIGHTNESS: 0
NERVOUS/ANXIOUS: 0
TROUBLE SWALLOWING: 0

## 2024-07-18 ENCOUNTER — APPOINTMENT (OUTPATIENT)
Dept: PRIMARY CARE | Facility: CLINIC | Age: 54
End: 2024-07-18
Payer: COMMERCIAL

## 2024-07-29 DIAGNOSIS — B20 SYMPTOMATIC HIV INFECTION (MULTI): ICD-10-CM

## 2024-07-29 RX ORDER — BICTEGRAVIR SODIUM, EMTRICITABINE, AND TENOFOVIR ALAFENAMIDE FUMARATE 50; 200; 25 MG/1; MG/1; MG/1
1 TABLET ORAL DAILY
Qty: 30 TABLET | Refills: 3 | Status: SHIPPED | OUTPATIENT
Start: 2024-07-29

## 2024-09-25 ENCOUNTER — TELEPHONE (OUTPATIENT)
Dept: PRIMARY CARE | Facility: CLINIC | Age: 54
End: 2024-09-25
Payer: COMMERCIAL

## 2024-09-25 DIAGNOSIS — R41.840 DIFFICULTY CONCENTRATING: ICD-10-CM

## 2024-09-25 RX ORDER — LISDEXAMFETAMINE DIMESYLATE 40 MG/1
40 CAPSULE ORAL DAILY
Qty: 90 CAPSULE | Refills: 0 | Status: SHIPPED | OUTPATIENT
Start: 2024-09-25

## 2024-09-25 NOTE — TELEPHONE ENCOUNTER
Patient requests prescription below    Last Office Visit: 7/15/2024   Next Office Visit: 10/14/2024     Requested Prescriptions     Pending Prescriptions Disp Refills    lisdexamfetamine (Vyvanse) 40 mg capsule 90 capsule 0     Sig: Take 1 capsule (40 mg) by mouth once daily.

## 2024-10-04 ENCOUNTER — OFFICE VISIT (OUTPATIENT)
Dept: IMMUNOLOGY | Facility: CLINIC | Age: 54
End: 2024-10-04
Payer: COMMERCIAL

## 2024-10-04 VITALS
DIASTOLIC BLOOD PRESSURE: 76 MMHG | BODY MASS INDEX: 28.54 KG/M2 | WEIGHT: 210.4 LBS | RESPIRATION RATE: 18 BRPM | HEART RATE: 73 BPM | OXYGEN SATURATION: 97 % | SYSTOLIC BLOOD PRESSURE: 143 MMHG

## 2024-10-04 DIAGNOSIS — F17.200 NICOTINE DEPENDENCE, UNCOMPLICATED, UNSPECIFIED NICOTINE PRODUCT TYPE: ICD-10-CM

## 2024-10-04 DIAGNOSIS — B20 SYMPTOMATIC HIV INFECTION (MULTI): Primary | ICD-10-CM

## 2024-10-04 DIAGNOSIS — A53.0 SYPHILI, LATENT: ICD-10-CM

## 2024-10-04 LAB
ALBUMIN SERPL BCP-MCNC: 4.5 G/DL (ref 3.4–5)
ALP SERPL-CCNC: 71 U/L (ref 33–120)
ALT SERPL W P-5'-P-CCNC: 24 U/L (ref 10–52)
ANION GAP SERPL CALC-SCNC: 14 MMOL/L (ref 10–20)
AST SERPL W P-5'-P-CCNC: 25 U/L (ref 9–39)
BASOPHILS # BLD AUTO: 0.04 X10*3/UL (ref 0–0.1)
BASOPHILS NFR BLD AUTO: 0.6 %
BILIRUB SERPL-MCNC: 0.9 MG/DL (ref 0–1.2)
BUN SERPL-MCNC: 14 MG/DL (ref 6–23)
CALCIUM SERPL-MCNC: 9.4 MG/DL (ref 8.6–10.6)
CD3+CD4+ CELLS # BLD: 1.37 X10E9/L
CD3+CD4+ CELLS # BLD: 1373 /MM3
CD3+CD4+ CELLS NFR BLD: 53 %
CD3+CD4+ CELLS/CD3+CD8+ CLL BLD: 1.47 %
CD3+CD8+ CELLS # BLD: 0.93 X10E9/L
CD3+CD8+ CELLS NFR BLD: 36 %
CHLORIDE SERPL-SCNC: 102 MMOL/L (ref 98–107)
CO2 SERPL-SCNC: 29 MMOL/L (ref 21–32)
CREAT SERPL-MCNC: 1.24 MG/DL (ref 0.5–1.3)
EGFRCR SERPLBLD CKD-EPI 2021: 70 ML/MIN/1.73M*2
EOSINOPHIL # BLD AUTO: 0.05 X10*3/UL (ref 0–0.7)
EOSINOPHIL NFR BLD AUTO: 0.8 %
ERYTHROCYTE [DISTWIDTH] IN BLOOD BY AUTOMATED COUNT: 12.3 % (ref 11.5–14.5)
FLOW CYTOMETRY SPECIALIST REVIEW: ABNORMAL
GLUCOSE SERPL-MCNC: 90 MG/DL (ref 74–99)
HCT VFR BLD AUTO: 44.4 % (ref 41–52)
HGB BLD-MCNC: 15.3 G/DL (ref 13.5–17.5)
IMM GRANULOCYTES # BLD AUTO: 0.01 X10*3/UL (ref 0–0.7)
IMM GRANULOCYTES NFR BLD AUTO: 0.2 % (ref 0–0.9)
LYMPHOCYTES # BLD AUTO: 2.59 X10*3/UL (ref 1.2–4.8)
LYMPHOCYTES # SPEC AUTO: 2.59 X10*3/UL
LYMPHOCYTES NFR BLD AUTO: 41.6 %
MCH RBC QN AUTO: 29.4 PG (ref 26–34)
MCHC RBC AUTO-ENTMCNC: 34.5 G/DL (ref 32–36)
MCV RBC AUTO: 85 FL (ref 80–100)
MONOCYTES # BLD AUTO: 0.38 X10*3/UL (ref 0.1–1)
MONOCYTES NFR BLD AUTO: 6.1 %
NEUTROPHILS # BLD AUTO: 3.15 X10*3/UL (ref 1.2–7.7)
NEUTROPHILS NFR BLD AUTO: 50.7 %
NRBC BLD-RTO: 0 /100 WBCS (ref 0–0)
PLATELET # BLD AUTO: 260 X10*3/UL (ref 150–450)
POTASSIUM SERPL-SCNC: 4.6 MMOL/L (ref 3.5–5.3)
PROT SERPL-MCNC: 7.5 G/DL (ref 6.4–8.2)
RBC # BLD AUTO: 5.21 X10*6/UL (ref 4.5–5.9)
SODIUM SERPL-SCNC: 140 MMOL/L (ref 136–145)
WBC # BLD AUTO: 6.2 X10*3/UL (ref 4.4–11.3)

## 2024-10-04 PROCEDURE — 88185 FLOWCYTOMETRY/TC ADD-ON: CPT | Performed by: FAMILY MEDICINE

## 2024-10-04 PROCEDURE — 99214 OFFICE O/P EST MOD 30 MIN: CPT | Performed by: FAMILY MEDICINE

## 2024-10-04 PROCEDURE — 87536 HIV-1 QUANT&REVRSE TRNSCRPJ: CPT | Performed by: FAMILY MEDICINE

## 2024-10-04 PROCEDURE — 80053 COMPREHEN METABOLIC PANEL: CPT | Performed by: FAMILY MEDICINE

## 2024-10-04 PROCEDURE — 36415 COLL VENOUS BLD VENIPUNCTURE: CPT | Performed by: FAMILY MEDICINE

## 2024-10-04 PROCEDURE — 85025 COMPLETE CBC W/AUTO DIFF WBC: CPT | Performed by: FAMILY MEDICINE

## 2024-10-04 ASSESSMENT — PAIN SCALES - GENERAL: PAINLEVEL: 0-NO PAIN

## 2024-10-04 NOTE — LETTER
10/04/24    Darion Washington DO  [address]  [facility] [address]  [facility] [address]      Dear Dr. Darion Washington DO,    I am writing to confirm that your patient, Josue Quintanilla, received care in my office on 10/04/24. I have enclosed a summary of the care provided to Josue for your reference.    Please contact me with any questions you may have regarding the visit.    Sincerely,         Lizeth Seymour MD  [address]  [facility] [address]  [facility] [address] [phone]    CC: No Recipients

## 2024-10-04 NOTE — PROGRESS NOTES
Subjective   Patient ID: Josue Quintanilla is a 54 y.o. who presents for follow-up.  HPI    55 yo here for follow-up    HIV  - Review of history: Was diagnosed in March 2024 when he presented to the hospital with fever and headache. LP was done and overall reassuring. HIV test done and positive, VL elevated at >10,000,000. CD4 545. On Biktarvy  - Last visit his VL was undetectable  - He is coping with diagnosis ok, tries not to think about it   - Sexually active with wife     Latent syphilis  - S/p treatment with PCN x 3 in March    Nicotine dependence   - Has continued to avoid nicotine since March    Anxiety  - Job has continued to be stressful, but anticipating some positive changes   - Anxiety is still bothersome, but doesn't want any more medicines  - Has one drink a day, sometimes more on the weekend. He is looking into cutting back further. Planning on doing this after hunting season this fall/winter     Review of Systems  10 point review of systems negative except as noted in HPI.    Objective     /76 (BP Location: Left arm, Patient Position: Sitting, BP Cuff Size: Adult)   Pulse 73   Resp 18   Wt 95.4 kg (210 lb 6.4 oz)   SpO2 97%   BMI 28.54 kg/m²   General: well appearing, no distress  Neck: No lymphadenopathy, no thyromegaly  CV: Regular rate and rhythm, no murmur  Lungs: Clear to auscultation bilaterally  Abdomen: Soft, nontender, nondistended  Extremities: No edema noted  Psych: Appropriate mood and affect     Assessment/Plan   55 yo here for follow-up    HIV  - Continue Biktarvy  - Check labs as ordered     Latent syphilis  - s/p treatment   - Will check Annually or sooner if new contacts      Tobacco use  - Congratulated on continued cessation    RTC 3 months. We discussed how he would like to come to the clinic less frequently, so for this visit we will do a virtual visit with labs done closer to home and plan on in person visits every 6 months (or more frequently if concerns arise)

## 2024-10-06 LAB
HIV1 RNA # PLAS NAA DL=20: ABNORMAL {COPIES}/ML
HIV1 RNA SPEC NAA+PROBE-LOG#: ABNORMAL {LOG_COPIES}/ML

## 2024-10-14 ENCOUNTER — APPOINTMENT (OUTPATIENT)
Dept: PRIMARY CARE | Facility: CLINIC | Age: 54
End: 2024-10-14
Payer: COMMERCIAL

## 2024-10-14 VITALS
HEART RATE: 82 BPM | DIASTOLIC BLOOD PRESSURE: 72 MMHG | WEIGHT: 210 LBS | HEIGHT: 72 IN | SYSTOLIC BLOOD PRESSURE: 116 MMHG | BODY MASS INDEX: 28.44 KG/M2 | TEMPERATURE: 97.3 F | RESPIRATION RATE: 17 BRPM

## 2024-10-14 DIAGNOSIS — D69.6 THROMBOCYTOPENIA (CMS-HCC): Primary | ICD-10-CM

## 2024-10-14 DIAGNOSIS — Z00.00 WELL ADULT EXAM: ICD-10-CM

## 2024-10-14 DIAGNOSIS — R41.840 DIFFICULTY CONCENTRATING: ICD-10-CM

## 2024-10-14 PROCEDURE — 99213 OFFICE O/P EST LOW 20 MIN: CPT | Performed by: FAMILY MEDICINE

## 2024-10-14 RX ORDER — TADALAFIL 20 MG/1
20 TABLET ORAL DAILY PRN
Qty: 45 TABLET | Refills: 3 | Status: SHIPPED | OUTPATIENT
Start: 2024-10-14 | End: 2025-10-14

## 2024-10-14 ASSESSMENT — PATIENT HEALTH QUESTIONNAIRE - PHQ9
SUM OF ALL RESPONSES TO PHQ9 QUESTIONS 1 AND 2: 0
1. LITTLE INTEREST OR PLEASURE IN DOING THINGS: NOT AT ALL
2. FEELING DOWN, DEPRESSED OR HOPELESS: NOT AT ALL

## 2024-10-14 ASSESSMENT — ENCOUNTER SYMPTOMS
HYPERACTIVE: 0
AGITATION: 0
FATIGUE: 0
SHORTNESS OF BREATH: 0
CHEST TIGHTNESS: 0
DECREASED CONCENTRATION: 1
NERVOUS/ANXIOUS: 0
HEADACHES: 0
TREMORS: 0
APPETITE CHANGE: 0
CONSTITUTIONAL NEGATIVE: 1

## 2024-10-14 NOTE — PROGRESS NOTES
OARRS:  No data recorded  I have personally reviewed the OARRS report for Josue ALONDRA Quintanilla. I have considered the risks of abuse, dependence, addiction and diversion    Is the patient prescribed a combination of a benzodiazepine and opioid?  No    Last Urine Drug Screen / ordered today: Yes  Recent Results (from the past 8760 hour(s))   Amphetamine Confirm, Urine    Collection Time: 03/26/24  4:13 PM   Result Value Ref Range    Methamphetamine Quant, Ur <200 ng/mL    MDA, Urine <200 ng/mL    MDEA, Urine <200 ng/mL    Phentermine,Urine <200 ng/mL    Amphetamines,Urine 2603 ng/mL    MDMA, Urine <200 ng/mL   Drug Screen, Urine With Reflex to Confirmation    Collection Time: 03/26/24  4:13 PM   Result Value Ref Range    Amphetamine Screen, Urine Presumptive Positive (A) Presumptive Negative    Barbiturate Screen, Urine Presumptive Negative Presumptive Negative    Benzodiazepines Screen, Urine Presumptive Negative Presumptive Negative    Cannabinoid Screen, Urine Presumptive Negative Presumptive Negative    Cocaine Metabolite Screen, Urine Presumptive Negative Presumptive Negative    Fentanyl Screen, Urine Presumptive Negative Presumptive Negative    Opiate Screen, Urine Presumptive Negative Presumptive Negative    Oxycodone Screen, Urine Presumptive Negative Presumptive Negative    PCP Screen, Urine Presumptive Negative Presumptive Negative    Methadone Screen, Urine Presumptive Negative Presumptive Negative   Drug Screen, Urine    Collection Time: 03/13/24  1:47 PM   Result Value Ref Range    Amphetamine Screen, Urine Presumptive Negative Presumptive Negative    Barbiturate Screen, Urine Presumptive Negative Presumptive Negative    Benzodiazepines Screen, Urine Presumptive Negative Presumptive Negative    Cannabinoid Screen, Urine Presumptive Negative Presumptive Negative    Cocaine Metabolite Screen, Urine Presumptive Negative Presumptive Negative    Fentanyl Screen, Urine Presumptive Negative Presumptive Negative     Opiate Screen, Urine Presumptive Negative Presumptive Negative    Oxycodone Screen, Urine Presumptive Negative Presumptive Negative    PCP Screen, Urine Presumptive Negative Presumptive Negative    Methadone Screen, Urine Presumptive Negative Presumptive Negative     Results are as expected.         Controlled Substance Agreement:  Date of the Last Agreement: 10/24  Reviewed Controlled Substance Agreement including but not limited to the benefits, risks, and alternatives to treatment with a Controlled Substance medication(s).    Stimulants:   What is the patient's goal of therapy? Help with concentration  Is this being achieved with current treatment? yes    Activities of Daily Living:   Is your overall impression that this patient is benefiting (symptom reduction outweighs side effects) from stimulant therapy? Yes     1. Physical Functioning: Better  2. Family Relationship: Better  3. Social Relationship: Better  4. Mood: Better  5. Sleep Patterns: Better  6. Overall Function: Better  Subjective   Patient ID: Josue Quintanilla is a 54 y.o. male who presents for Med Refill (3 month med check ).    HPI     Review of Systems   Constitutional: Negative.  Negative for appetite change and fatigue.   Respiratory:  Negative for chest tightness and shortness of breath.    Neurological:  Negative for tremors and headaches.   Psychiatric/Behavioral:  Positive for decreased concentration. Negative for agitation. The patient is not nervous/anxious and is not hyperactive.         No Insomnia       Objective   /72   Pulse 82   Temp 36.3 °C (97.3 °F)   Resp 17   Ht 1.829 m (6')   Wt 95.3 kg (210 lb)   BMI 28.48 kg/m²     Physical Exam  Constitutional:       Appearance: Normal appearance.   HENT:      Head: Normocephalic and atraumatic.   Cardiovascular:      Rate and Rhythm: Normal rate and regular rhythm.   Pulmonary:      Effort: Pulmonary effort is normal.   Skin:     General: Skin is warm and dry.   Neurological:       Mental Status: He is alert and oriented to person, place, and time.   Psychiatric:         Attention and Perception: He is inattentive.         Mood and Affect: Mood normal.         Speech: Speech normal.         Behavior: Behavior normal. Behavior is cooperative.         Cognition and Memory: Cognition normal.         Assessment/Plan   Problem List Items Addressed This Visit             ICD-10-CM    Difficulty concentrating R41.840    Thrombocytopenia (CMS-HCC) - Primary D69.6    Relevant Orders    Follow Up In [facility] - PCP     Other Visit Diagnoses         Codes    Well adult exam     Z00.00    Relevant Medications    tadalafil (Cialis) 20 mg tablet             Pt to call when he needs a rf of vyvanse

## 2024-10-17 ENCOUNTER — OFFICE VISIT (OUTPATIENT)
Dept: PRIMARY CARE | Facility: CLINIC | Age: 54
End: 2024-10-17

## 2024-10-17 VITALS
HEART RATE: 76 BPM | RESPIRATION RATE: 17 BRPM | WEIGHT: 212 LBS | DIASTOLIC BLOOD PRESSURE: 82 MMHG | HEIGHT: 72 IN | BODY MASS INDEX: 28.71 KG/M2 | TEMPERATURE: 97 F | SYSTOLIC BLOOD PRESSURE: 126 MMHG

## 2024-10-17 DIAGNOSIS — S30.861A TICK BITE OF ABDOMINAL WALL, INITIAL ENCOUNTER: Primary | ICD-10-CM

## 2024-10-17 DIAGNOSIS — S30.861A TICK BITE OF ABDOMINAL WALL, INITIAL ENCOUNTER: ICD-10-CM
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
## 2024-10-17 PROCEDURE — 99213 OFFICE O/P EST LOW 20 MIN: CPT | Performed by: FAMILY MEDICINE

## 2024-10-17 RX ORDER — AMOXICILLIN 875 MG/1
875 TABLET, FILM COATED ORAL 2 TIMES DAILY
Qty: 20 TABLET | Refills: 0 | Status: SHIPPED | OUTPATIENT
Start: 2024-10-17 | End: 2024-10-17 | Stop reason: SDUPTHER

## 2024-10-17 RX ORDER — AMOXICILLIN 875 MG/1
875 TABLET, FILM COATED ORAL 2 TIMES DAILY
Qty: 20 TABLET | Refills: 3 | Status: SHIPPED | OUTPATIENT
Start: 2024-10-17 | End: 2024-11-26

## 2024-10-17 ASSESSMENT — ENCOUNTER SYMPTOMS
ABDOMINAL PAIN: 0
MYALGIAS: 0
FEVER: 0
ARTHRALGIAS: 0
FATIGUE: 0
NAUSEA: 0
VOMITING: 0

## 2024-10-17 NOTE — TELEPHONE ENCOUNTER
Patient requests prescription below    Last Office Visit: 10/17/2024   Next Office Visit: 1/13/2025     Requested Prescriptions     Pending Prescriptions Disp Refills    amoxicillin (Amoxil) 875 mg tablet 20 tablet 0     Sig: Take 1 tablet (875 mg) by mouth 2 times a day for 10 days.

## 2024-10-17 NOTE — PROGRESS NOTES
Subjective   Patient ID: Josue Quintanilla is a 54 y.o. male who presents for Insect Bite (Pt is here for a tick bite on the lower right abdomin. He states it is red, itchy and raised. No drainage. He thinks it may have happened 5 days ago.   ).    HPI     Review of Systems   Constitutional:  Negative for fatigue and fever.   Gastrointestinal:  Negative for abdominal pain, nausea and vomiting.   Musculoskeletal:  Negative for arthralgias and myalgias.   Skin:  Positive for rash.       Objective   /82   Pulse 76   Temp 36.1 °C (97 °F)   Resp 17   Ht 1.829 m (6')   Wt 96.2 kg (212 lb)   BMI 28.75 kg/m²     Physical Exam  Abdominal:      Tenderness: There is no abdominal tenderness.   Lymphadenopathy:      Lower Body: No right inguinal adenopathy.   Skin:     Findings: Rash present.             Comments: Bite right lower abdomen with erythema         Assessment/Plan   Problem List Items Addressed This Visit             ICD-10-CM    Tick bite - Primary W57.XXXA    Relevant Medications    amoxicillin (Amoxil) 875 mg tablet

## 2024-12-02 DIAGNOSIS — B20 SYMPTOMATIC HIV INFECTION (MULTI): ICD-10-CM

## 2024-12-02 RX ORDER — BICTEGRAVIR SODIUM, EMTRICITABINE, AND TENOFOVIR ALAFENAMIDE FUMARATE 50; 200; 25 MG/1; MG/1; MG/1
1 TABLET ORAL DAILY
Qty: 30 TABLET | Refills: 3 | Status: SHIPPED | OUTPATIENT
Start: 2024-12-02

## 2025-01-06 ENCOUNTER — TELEPHONE (OUTPATIENT)
Dept: PRIMARY CARE | Facility: CLINIC | Age: 55
End: 2025-01-06
Payer: COMMERCIAL

## 2025-01-06 DIAGNOSIS — R41.840 DIFFICULTY CONCENTRATING: ICD-10-CM

## 2025-01-06 RX ORDER — LISDEXAMFETAMINE DIMESYLATE 40 MG/1
40 CAPSULE ORAL DAILY
Qty: 90 CAPSULE | Refills: 0 | Status: SHIPPED | OUTPATIENT
Start: 2025-01-06

## 2025-01-06 NOTE — TELEPHONE ENCOUNTER
Patient requests prescription below    Last Office Visit: 10/17/2024   Next Office Visit: 1/13/2025     Requested Prescriptions     Pending Prescriptions Disp Refills    lisdexamfetamine (Vyvanse) 40 mg capsule 90 capsule 0     Sig: Take 1 capsule (40 mg) by mouth once daily.

## 2025-01-13 ENCOUNTER — APPOINTMENT (OUTPATIENT)
Dept: PRIMARY CARE | Facility: CLINIC | Age: 55
End: 2025-01-13
Payer: COMMERCIAL

## 2025-01-13 VITALS
HEIGHT: 72 IN | HEART RATE: 86 BPM | RESPIRATION RATE: 16 BRPM | BODY MASS INDEX: 28.85 KG/M2 | SYSTOLIC BLOOD PRESSURE: 124 MMHG | OXYGEN SATURATION: 98 % | TEMPERATURE: 96.7 F | WEIGHT: 213 LBS | DIASTOLIC BLOOD PRESSURE: 84 MMHG

## 2025-01-13 DIAGNOSIS — R41.840 DIFFICULTY CONCENTRATING: ICD-10-CM

## 2025-01-13 DIAGNOSIS — D69.6 THROMBOCYTOPENIA (CMS-HCC): ICD-10-CM

## 2025-01-13 PROCEDURE — 99213 OFFICE O/P EST LOW 20 MIN: CPT | Performed by: FAMILY MEDICINE

## 2025-01-13 RX ORDER — LISDEXAMFETAMINE DIMESYLATE 40 MG/1
40 CAPSULE ORAL DAILY
Qty: 90 CAPSULE | Refills: 0 | Status: SHIPPED | OUTPATIENT
Start: 2025-01-13 | End: 2025-01-15 | Stop reason: SDUPTHER

## 2025-01-13 ASSESSMENT — ENCOUNTER SYMPTOMS
FATIGUE: 0
SHORTNESS OF BREATH: 0
HEADACHES: 0
NERVOUS/ANXIOUS: 0
CHEST TIGHTNESS: 0
AGITATION: 0
DECREASED CONCENTRATION: 1
CONSTITUTIONAL NEGATIVE: 1
TREMORS: 0
APPETITE CHANGE: 0
HYPERACTIVE: 0

## 2025-01-13 NOTE — PROGRESS NOTES
OARRS:  Darion Washington, DO on 1/13/2025  4:35 PM  I have personally reviewed the OARRS report for Josue Quintanilla. I have considered the risks of abuse, dependence, addiction and diversion    Is the patient prescribed a combination of a benzodiazepine and opioid?  No    Last Urine Drug Screen / ordered today: No  Recent Results (from the past 8760 hours)   Amphetamine Confirm, Urine    Collection Time: 03/26/24  4:13 PM   Result Value Ref Range    Methamphetamine Quant, Ur <200 ng/mL    MDA, Urine <200 ng/mL    MDEA, Urine <200 ng/mL    Phentermine,Urine <200 ng/mL    Amphetamines,Urine 2603 ng/mL    MDMA, Urine <200 ng/mL   Drug Screen, Urine With Reflex to Confirmation    Collection Time: 03/26/24  4:13 PM   Result Value Ref Range    Amphetamine Screen, Urine Presumptive Positive (A) Presumptive Negative    Barbiturate Screen, Urine Presumptive Negative Presumptive Negative    Benzodiazepines Screen, Urine Presumptive Negative Presumptive Negative    Cannabinoid Screen, Urine Presumptive Negative Presumptive Negative    Cocaine Metabolite Screen, Urine Presumptive Negative Presumptive Negative    Fentanyl Screen, Urine Presumptive Negative Presumptive Negative    Opiate Screen, Urine Presumptive Negative Presumptive Negative    Oxycodone Screen, Urine Presumptive Negative Presumptive Negative    PCP Screen, Urine Presumptive Negative Presumptive Negative    Methadone Screen, Urine Presumptive Negative Presumptive Negative   Drug Screen, Urine    Collection Time: 03/13/24  1:47 PM   Result Value Ref Range    Amphetamine Screen, Urine Presumptive Negative Presumptive Negative    Barbiturate Screen, Urine Presumptive Negative Presumptive Negative    Benzodiazepines Screen, Urine Presumptive Negative Presumptive Negative    Cannabinoid Screen, Urine Presumptive Negative Presumptive Negative    Cocaine Metabolite Screen, Urine Presumptive Negative Presumptive Negative    Fentanyl Screen, Urine Presumptive Negative  Presumptive Negative    Opiate Screen, Urine Presumptive Negative Presumptive Negative    Oxycodone Screen, Urine Presumptive Negative Presumptive Negative    PCP Screen, Urine Presumptive Negative Presumptive Negative    Methadone Screen, Urine Presumptive Negative Presumptive Negative     Results are as expected.         Controlled Substance Agreement:  Date of the Last Agreement: 1/25  Reviewed Controlled Substance Agreement including but not limited to the benefits, risks, and alternatives to treatment with a Controlled Substance medication(s).    Stimulants:   What is the patient's goal of therapy? Help with concentration  Is this being achieved with current treatment? yes    Activities of Daily Living:   Is your overall impression that this patient is benefiting (symptom reduction outweighs side effects) from stimulant therapy? Yes     1. Physical Functioning: Better  2. Family Relationship: Better  3. Social Relationship: Better  4. Mood: Better  5. Sleep Patterns: Better  6. Overall Function: Better  Subjective   Patient ID: Josue Quintanilla is a 54 y.o. male who presents for Med Refill (3 month med check. ).    HPI     Review of Systems   Constitutional: Negative.  Negative for appetite change and fatigue.   Respiratory:  Negative for chest tightness and shortness of breath.    Neurological:  Negative for tremors and headaches.   Psychiatric/Behavioral:  Positive for decreased concentration. Negative for agitation. The patient is not nervous/anxious and is not hyperactive.         No Insomnia       Objective   /84   Pulse 86   Temp 35.9 °C (96.7 °F)   Resp 16   Ht 1.829 m (6')   Wt 96.6 kg (213 lb)   SpO2 98%   BMI 28.89 kg/m²     Physical Exam  Constitutional:       Appearance: Normal appearance.   HENT:      Head: Normocephalic and atraumatic.   Cardiovascular:      Rate and Rhythm: Normal rate and regular rhythm.   Pulmonary:      Effort: Pulmonary effort is normal.   Skin:     General: Skin is  warm and dry.   Neurological:      Mental Status: He is alert and oriented to person, place, and time.   Psychiatric:         Attention and Perception: He is inattentive.         Mood and Affect: Mood normal.         Speech: Speech normal.         Behavior: Behavior normal. Behavior is cooperative.         Cognition and Memory: Cognition normal.         Assessment/Plan   Problem List Items Addressed This Visit             ICD-10-CM    Difficulty concentrating R41.840    Thrombocytopenia (CMS-HCC) D69.6

## 2025-01-15 DIAGNOSIS — R41.840 DIFFICULTY CONCENTRATING: ICD-10-CM

## 2025-01-15 RX ORDER — LISDEXAMFETAMINE DIMESYLATE 40 MG/1
40 CAPSULE ORAL DAILY
Qty: 90 CAPSULE | Refills: 0 | Status: SHIPPED | OUTPATIENT
Start: 2025-01-15

## 2025-01-15 NOTE — TELEPHONE ENCOUNTER
Patient requests prescription below    Last Office Visit: 1/13/2025   Next Office Visit: 4/14/2025     Requested Prescriptions     Pending Prescriptions Disp Refills    lisdexamfetamine (Vyvanse) 40 mg capsule 90 capsule 0     Sig: Take 1 capsule (40 mg) by mouth once daily.

## 2025-01-17 ENCOUNTER — TELEMEDICINE (OUTPATIENT)
Dept: IMMUNOLOGY | Facility: CLINIC | Age: 55
End: 2025-01-17
Payer: COMMERCIAL

## 2025-01-17 DIAGNOSIS — Z11.3 SCREENING EXAMINATION FOR STD (SEXUALLY TRANSMITTED DISEASE): ICD-10-CM

## 2025-01-17 DIAGNOSIS — B20 SYMPTOMATIC HIV INFECTION (MULTI): Primary | ICD-10-CM

## 2025-01-17 PROCEDURE — 99213 OFFICE O/P EST LOW 20 MIN: CPT | Performed by: FAMILY MEDICINE

## 2025-01-17 PROCEDURE — 1036F TOBACCO NON-USER: CPT | Performed by: FAMILY MEDICINE

## 2025-01-17 NOTE — PROGRESS NOTES
Subjective   Patient ID: Josue Quintanilla is a 54 y.o. who presents for follow-up.  HPI    Virtual or Telephone Consent    An interactive audio and video telecommunication system which permits real time communications between the patient (at the originating site) and provider (at the distant site) was utilized to provide this telehealth service.   Verbal consent was requested and obtained from Josue Quintanilla on this date, 01/17/25 for a telehealth visit.       55 yo seen via virtual visit for follow-up    HIV  - Review of history: Was diagnosed in March 2024 when he presented to the hospital with fever and headache. LP was done and overall reassuring. HIV test done and positive, VL elevated at >10,000,000. CD4 545. On Biktarvy  - VL undetectable  - Consistently taking Biktarvy   - Last visit discussed desire to minimize in- office visits, as he is struggling with coping with this diagnosis. We agreed on in office visits every 6 months with virtual visits and labs in between     Latent syphilis  - S/p treatment with PCN x 3 in March 2024    Nicotine dependence   - Has continued to avoid nicotine    Anxiety  - Job has continues to be stressful  - Anxiety is still present, but doesn't want any more medicines  - Didn't tolerate wellbutrin   - Alcohol use-  Has 1 drink a day, sometimes more. Was thinking about cutting back further but stress has impeded this.   - Has started exercise, which helps anxiety    Review of Systems  10 point review of systems negative except as noted in HPI.    Objective     There were no vitals taken for this visit.  Gen: Well appearing, no acute distress  HEENT: Mucous membranes moist  Pulm: Respirations nonlabored  Psych: Normal mood and affect    Assessment/Plan   55 yo seen via virtual visit for follow-up    HIV  - Continue Biktarvy  - Check labs as ordered     Tobacco use  - Congratulated on continued cessation    Discussed anxiety, will continue to monitor    RTC 3 months

## 2025-01-24 ENCOUNTER — APPOINTMENT (OUTPATIENT)
Dept: LAB | Facility: LAB | Age: 55
End: 2025-01-24
Payer: COMMERCIAL

## 2025-01-25 ENCOUNTER — LAB (OUTPATIENT)
Dept: LAB | Facility: LAB | Age: 55
End: 2025-01-25
Payer: COMMERCIAL

## 2025-01-25 DIAGNOSIS — Z11.3 SCREENING EXAMINATION FOR STD (SEXUALLY TRANSMITTED DISEASE): ICD-10-CM

## 2025-01-25 DIAGNOSIS — B20 SYMPTOMATIC HIV INFECTION (MULTI): ICD-10-CM

## 2025-01-25 LAB
ALBUMIN SERPL BCP-MCNC: 4.7 G/DL (ref 3.4–5)
ALP SERPL-CCNC: 77 U/L (ref 33–120)
ALT SERPL W P-5'-P-CCNC: 26 U/L (ref 10–52)
ANION GAP SERPL CALC-SCNC: 14 MMOL/L
AST SERPL W P-5'-P-CCNC: 22 U/L (ref 9–39)
BASOPHILS # BLD AUTO: 0.03 X10*3/UL (ref 0–0.1)
BASOPHILS NFR BLD AUTO: 0.5 %
BILIRUB SERPL-MCNC: 0.9 MG/DL (ref 0–1.2)
BUN SERPL-MCNC: 14 MG/DL (ref 6–23)
CALCIUM SERPL-MCNC: 9.6 MG/DL (ref 8.6–10.3)
CHLORIDE SERPL-SCNC: 101 MMOL/L (ref 98–107)
CO2 SERPL-SCNC: 26 MMOL/L (ref 21–32)
CREAT SERPL-MCNC: 1.18 MG/DL (ref 0.5–1.3)
EGFRCR SERPLBLD CKD-EPI 2021: 74 ML/MIN/1.73M*2
EOSINOPHIL # BLD AUTO: 0.04 X10*3/UL (ref 0–0.7)
EOSINOPHIL NFR BLD AUTO: 0.7 %
ERYTHROCYTE [DISTWIDTH] IN BLOOD BY AUTOMATED COUNT: 11.9 % (ref 11.5–14.5)
GLUCOSE SERPL-MCNC: 131 MG/DL (ref 74–99)
HCT VFR BLD AUTO: 44.6 % (ref 41–52)
HGB BLD-MCNC: 15.5 G/DL (ref 13.5–17.5)
IMM GRANULOCYTES # BLD AUTO: 0.01 X10*3/UL (ref 0–0.7)
IMM GRANULOCYTES NFR BLD AUTO: 0.2 % (ref 0–0.9)
LYMPHOCYTES # BLD AUTO: 2.63 X10*3/UL (ref 1.2–4.8)
LYMPHOCYTES NFR BLD AUTO: 43.3 %
MCH RBC QN AUTO: 29.8 PG (ref 26–34)
MCHC RBC AUTO-ENTMCNC: 34.8 G/DL (ref 32–36)
MCV RBC AUTO: 86 FL (ref 80–100)
MONOCYTES # BLD AUTO: 0.43 X10*3/UL (ref 0.1–1)
MONOCYTES NFR BLD AUTO: 7.1 %
NEUTROPHILS # BLD AUTO: 2.94 X10*3/UL (ref 1.2–7.7)
NEUTROPHILS NFR BLD AUTO: 48.2 %
NRBC BLD-RTO: 0 /100 WBCS (ref 0–0)
PLATELET # BLD AUTO: 258 X10*3/UL (ref 150–450)
POTASSIUM SERPL-SCNC: 3.8 MMOL/L (ref 3.5–5.3)
PROT SERPL-MCNC: 7.5 G/DL (ref 6.4–8.2)
RBC # BLD AUTO: 5.2 X10*6/UL (ref 4.5–5.9)
SODIUM SERPL-SCNC: 137 MMOL/L (ref 136–145)
WBC # BLD AUTO: 6.1 X10*3/UL (ref 4.4–11.3)

## 2025-01-25 PROCEDURE — 88185 FLOWCYTOMETRY/TC ADD-ON: CPT

## 2025-01-25 PROCEDURE — 86318 IA INFECTIOUS AGENT ANTIBODY: CPT

## 2025-01-25 PROCEDURE — 85025 COMPLETE CBC W/AUTO DIFF WBC: CPT

## 2025-01-25 PROCEDURE — 80053 COMPREHEN METABOLIC PANEL: CPT

## 2025-01-25 PROCEDURE — 86780 TREPONEMA PALLIDUM: CPT

## 2025-01-25 PROCEDURE — 87536 HIV-1 QUANT&REVRSE TRNSCRPJ: CPT

## 2025-01-25 PROCEDURE — 88184 FLOWCYTOMETRY/ TC 1 MARKER: CPT

## 2025-01-26 LAB
HIV1 RNA # PLAS NAA DL=20: NOT DETECTED {COPIES}/ML
HIV1 RNA SPEC NAA+PROBE-LOG#: NORMAL {LOG_COPIES}/ML
RPR SER QL: NONREACTIVE
TREPONEMA PALLIDUM IGG+IGM AB [PRESENCE] IN SERUM OR PLASMA BY IMMUNOASSAY: REACTIVE

## 2025-01-27 LAB
CD3+CD4+ CELLS # BLD: 1.34 X10E9/L
CD3+CD4+ CELLS # BLD: 1341 /MM3
CD3+CD4+ CELLS NFR BLD: 51 %
CD3+CD4+ CELLS/CD3+CD8+ CLL BLD: 1.31 %
CD3+CD8+ CELLS # BLD: 1.03 X10E9/L
CD3+CD8+ CELLS NFR BLD: 39 %
FLOW CYTOMETRY SPECIALIST REVIEW: ABNORMAL
LYMPHOCYTES # SPEC AUTO: 2.63 X10*3/UL
T PALLIDUM AB SER QL AGGL: REACTIVE

## 2025-02-05 DIAGNOSIS — R41.840 DIFFICULTY CONCENTRATING: ICD-10-CM

## 2025-02-05 RX ORDER — LISDEXAMFETAMINE DIMESYLATE 40 MG/1
40 CAPSULE ORAL DAILY
Qty: 90 CAPSULE | Refills: 0 | Status: SHIPPED | OUTPATIENT
Start: 2025-02-05

## 2025-03-25 ENCOUNTER — PATIENT MESSAGE (OUTPATIENT)
Dept: IMMUNOLOGY | Facility: CLINIC | Age: 55
End: 2025-03-25
Payer: COMMERCIAL

## 2025-03-26 DIAGNOSIS — B20 SYMPTOMATIC HIV INFECTION (MULTI): ICD-10-CM

## 2025-03-27 RX ORDER — BICTEGRAVIR SODIUM, EMTRICITABINE, AND TENOFOVIR ALAFENAMIDE FUMARATE 50; 200; 25 MG/1; MG/1; MG/1
1 TABLET ORAL DAILY
Qty: 30 TABLET | Refills: 3 | Status: SHIPPED | OUTPATIENT
Start: 2025-03-27

## 2025-04-02 DIAGNOSIS — B20 SYMPTOMATIC HIV INFECTION (MULTI): ICD-10-CM

## 2025-04-02 RX ORDER — BICTEGRAVIR SODIUM, EMTRICITABINE, AND TENOFOVIR ALAFENAMIDE FUMARATE 50; 200; 25 MG/1; MG/1; MG/1
1 TABLET ORAL DAILY
Qty: 90 TABLET | Refills: 3 | OUTPATIENT
Start: 2025-04-02 | End: 2025-04-04 | Stop reason: SDUPTHER

## 2025-04-04 DIAGNOSIS — B20 SYMPTOMATIC HIV INFECTION (MULTI): ICD-10-CM

## 2025-04-04 RX ORDER — BICTEGRAVIR SODIUM, EMTRICITABINE, AND TENOFOVIR ALAFENAMIDE FUMARATE 50; 200; 25 MG/1; MG/1; MG/1
1 TABLET ORAL DAILY
Qty: 90 TABLET | Refills: 3 | Status: SHIPPED | OUTPATIENT
Start: 2025-04-04

## 2025-04-14 ENCOUNTER — APPOINTMENT (OUTPATIENT)
Dept: PRIMARY CARE | Facility: CLINIC | Age: 55
End: 2025-04-14
Payer: COMMERCIAL

## 2025-04-14 VITALS
HEART RATE: 100 BPM | TEMPERATURE: 96.6 F | RESPIRATION RATE: 16 BRPM | BODY MASS INDEX: 28.04 KG/M2 | HEIGHT: 72 IN | DIASTOLIC BLOOD PRESSURE: 86 MMHG | OXYGEN SATURATION: 97 % | SYSTOLIC BLOOD PRESSURE: 122 MMHG | WEIGHT: 207 LBS
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
## 2025-04-14 DIAGNOSIS — R41.840 DIFFICULTY CONCENTRATING: ICD-10-CM

## 2025-04-14 DIAGNOSIS — S30.861A TICK BITE OF ABDOMINAL WALL, INITIAL ENCOUNTER: Primary | ICD-10-CM

## 2025-04-14 PROCEDURE — 99213 OFFICE O/P EST LOW 20 MIN: CPT | Performed by: FAMILY MEDICINE

## 2025-04-14 RX ORDER — DOXYCYCLINE 100 MG/1
100 CAPSULE ORAL 2 TIMES DAILY
Qty: 28 CAPSULE | Refills: 0 | Status: SHIPPED | OUTPATIENT
Start: 2025-04-14 | End: 2025-04-28

## 2025-04-14 ASSESSMENT — ENCOUNTER SYMPTOMS
TREMORS: 0
HYPERACTIVE: 0
NERVOUS/ANXIOUS: 0
DECREASED CONCENTRATION: 1
HEADACHES: 0
SHORTNESS OF BREATH: 0
CHEST TIGHTNESS: 0
AGITATION: 0
CONSTITUTIONAL NEGATIVE: 1
APPETITE CHANGE: 0
FATIGUE: 0

## 2025-04-14 NOTE — PROGRESS NOTES
OARRS:  No data recorded  I have personally reviewed the OARRS report for Josue Quintanilla. I have considered the risks of abuse, dependence, addiction and diversion    Is the patient prescribed a combination of a benzodiazepine and opioid?  No    Last Urine Drug Screen / ordered today: Yes  No results found for this or any previous visit (from the past 8760 hours).  Results are as expected.         Controlled Substance Agreement:  Date of the Last Agreement: 1/25  Reviewed Controlled Substance Agreement including but not limited to the benefits, risks, and alternatives to treatment with a Controlled Substance medication(s).    Stimulants:   What is the patient's goal of therapy? Help with concentration  Is this being achieved with current treatment? yes    Activities of Daily Living:   Is your overall impression that this patient is benefiting (symptom reduction outweighs side effects) from stimulant therapy? Yes     1. Physical Functioning: Better  2. Family Relationship: Better  3. Social Relationship: Better  4. Mood: Better  5. Sleep Patterns: Better  6. Overall Function: Better  Subjective   Patient ID: Josue Quintanilla is a 54 y.o. male who presents for Med Refill (3 month med check ).    HPI     Review of Systems   Constitutional: Negative.  Negative for appetite change and fatigue.   Respiratory:  Negative for chest tightness and shortness of breath.    Neurological:  Negative for tremors and headaches.   Psychiatric/Behavioral:  Positive for decreased concentration. Negative for agitation. The patient is not nervous/anxious and is not hyperactive.         No Insomnia       Objective   /86   Pulse 100   Temp 35.9 °C (96.6 °F)   Resp 16   Ht 1.829 m (6')   Wt 93.9 kg (207 lb)   SpO2 97%   BMI 28.07 kg/m²     Physical Exam  Constitutional:       Appearance: Normal appearance.   HENT:      Head: Normocephalic and atraumatic.   Cardiovascular:      Rate and Rhythm: Normal rate and regular rhythm.    Pulmonary:      Effort: Pulmonary effort is normal.   Skin:     General: Skin is warm and dry.   Neurological:      Mental Status: He is alert and oriented to person, place, and time.   Psychiatric:         Attention and Perception: He is inattentive.         Mood and Affect: Mood normal.         Speech: Speech normal.         Behavior: Behavior normal. Behavior is cooperative.         Cognition and Memory: Cognition normal.       Assessment/Plan   Problem List Items Addressed This Visit             ICD-10-CM    Difficulty concentrating R41.840    Relevant Orders    Drug Screen, Urine With Reflex to Confirmation    Tick bite - Primary W57.XXXA    Relevant Medications    doxycycline (Vibramycin) 100 mg capsule

## 2025-04-16 LAB
AMPHET UR-MCNC: 5060 NG/ML
AMPHETAMINES UR QL: POSITIVE NG/ML
BARBITURATES UR QL: NEGATIVE NG/ML
BENZODIAZ UR QL: NEGATIVE NG/ML
BZE UR QL: NEGATIVE NG/ML
CREAT UR-MCNC: 212.6 MG/DL
DRUG SCREEN COMMENT UR-IMP: ABNORMAL
DRUG SCREEN COMMENT UR-IMP: ABNORMAL
FENTANYL UR QL SCN: NEGATIVE NG/ML
METHADONE UR QL: NEGATIVE NG/ML
METHAMPHET UR-MCNC: NEGATIVE NG/ML
OPIATES UR QL: NEGATIVE NG/ML
OXIDANTS UR QL: NEGATIVE MCG/ML
OXYCODONE UR QL: NEGATIVE NG/ML
PCP UR QL: NEGATIVE NG/ML
PH UR: 5.9 [PH] (ref 4.5–9)
QUEST NOTES AND COMMENTS: ABNORMAL
THC UR QL: POSITIVE NG/ML
THC UR-MCNC: 11 NG/ML

## 2025-04-28 DIAGNOSIS — R41.840 DIFFICULTY CONCENTRATING: ICD-10-CM

## 2025-04-28 RX ORDER — LISDEXAMFETAMINE DIMESYLATE 40 MG/1
40 CAPSULE ORAL DAILY
Qty: 90 CAPSULE | Refills: 0 | Status: SHIPPED | OUTPATIENT
Start: 2025-04-28

## 2025-05-08 DIAGNOSIS — R41.840 DIFFICULTY CONCENTRATING: ICD-10-CM

## 2025-05-08 RX ORDER — LISDEXAMFETAMINE DIMESYLATE 40 MG/1
40 CAPSULE ORAL DAILY
Qty: 90 CAPSULE | Refills: 0 | Status: SHIPPED | OUTPATIENT
Start: 2025-05-08

## 2025-05-08 NOTE — TELEPHONE ENCOUNTER
Patient requests prescription below    Last Office Visit: 4/14/2025   Next Office Visit: 6/30/2025     Requested Prescriptions     Pending Prescriptions Disp Refills    lisdexamfetamine (Vyvanse) 40 mg capsule 90 capsule 0     Sig: Take 1 capsule (40 mg) by mouth once daily.

## 2025-05-16 DIAGNOSIS — R41.840 DIFFICULTY CONCENTRATING: ICD-10-CM

## 2025-05-16 NOTE — TELEPHONE ENCOUNTER
Patient requests prescription below    Last Office Visit: 4/14/2025   Next Office Visit: 6/30/2025     Requested Prescriptions     Pending Prescriptions Disp Refills    lisdexamfetamine (Vyvanse) 40 mg capsule 30 capsule 0     Sig: Take 1 capsule (40 mg) by mouth once daily.

## 2025-05-19 RX ORDER — LISDEXAMFETAMINE DIMESYLATE 40 MG/1
40 CAPSULE ORAL DAILY
Qty: 30 CAPSULE | Refills: 0 | Status: SHIPPED | OUTPATIENT
Start: 2025-05-19

## 2025-06-30 ENCOUNTER — APPOINTMENT (OUTPATIENT)
Dept: PRIMARY CARE | Facility: CLINIC | Age: 55
End: 2025-06-30
Payer: COMMERCIAL

## 2025-06-30 VITALS
SYSTOLIC BLOOD PRESSURE: 124 MMHG | WEIGHT: 208 LBS | HEART RATE: 84 BPM | HEIGHT: 72 IN | DIASTOLIC BLOOD PRESSURE: 86 MMHG | RESPIRATION RATE: 16 BRPM | OXYGEN SATURATION: 99 % | TEMPERATURE: 96.6 F | BODY MASS INDEX: 28.17 KG/M2

## 2025-06-30 DIAGNOSIS — Z00.00 WELL ADULT EXAM: ICD-10-CM

## 2025-06-30 DIAGNOSIS — R41.840 DIFFICULTY CONCENTRATING: ICD-10-CM

## 2025-06-30 PROCEDURE — 99213 OFFICE O/P EST LOW 20 MIN: CPT | Performed by: FAMILY MEDICINE

## 2025-06-30 RX ORDER — TADALAFIL 20 MG/1
20 TABLET ORAL DAILY PRN
Qty: 45 TABLET | Refills: 3 | Status: SHIPPED | OUTPATIENT
Start: 2025-06-30 | End: 2026-06-30

## 2025-06-30 ASSESSMENT — ENCOUNTER SYMPTOMS
HEADACHES: 0
SHORTNESS OF BREATH: 0
APPETITE CHANGE: 0
FATIGUE: 0
HYPERACTIVE: 0
TREMORS: 0
CHEST TIGHTNESS: 0
CONSTITUTIONAL NEGATIVE: 1
NERVOUS/ANXIOUS: 0
DECREASED CONCENTRATION: 1
AGITATION: 0

## 2025-06-30 ASSESSMENT — PATIENT HEALTH QUESTIONNAIRE - PHQ9
SUM OF ALL RESPONSES TO PHQ9 QUESTIONS 1 AND 2: 0
2. FEELING DOWN, DEPRESSED OR HOPELESS: NOT AT ALL
1. LITTLE INTEREST OR PLEASURE IN DOING THINGS: NOT AT ALL

## 2025-06-30 NOTE — PROGRESS NOTES
OARRS:  No data recorded  I have personally reviewed the OARRS report for Josue Quintanilla. I have considered the risks of abuse, dependence, addiction and diversion    Is the patient prescribed a combination of a benzodiazepine and opioid?  No    Last Urine Drug Screen / ordered today: No  Recent Results (from the past 8760 hours)   Drug Screen, Urine With Reflex to Confirmation    Collection Time: 04/14/25  4:22 PM   Result Value Ref Range    Fentanyl NEGATIVE <0.5 ng/mL    Amphetamines POSITIVE (A) <500 ng/mL    Amphetamine 5,060 (H) <250 ng/mL    Methamphetamine NEGATIVE <250 ng/mL    Amphetamines Comments      Barbiturates NEGATIVE <300 ng/mL    Benzodiazepines NEGATIVE <100 ng/mL    Cocaine Metabolite NEGATIVE <150 ng/mL    Marijuana Metabolite POSITIVE (A) <20 ng/mL    Marijuana Metabolite 11 (H) <5 ng/mL    Marijuana Comments      Methadone Metabolite NEGATIVE <100 ng/mL    Opiates NEGATIVE <100 ng/mL    Oxycodone NEGATIVE <100 ng/mL    Phencyclidine NEGATIVE <25 ng/mL    Creatinine 212.6 > or = 20.0 mg/dL    pH 5.9 4.5 - 9.0    Oxidant NEGATIVE <200 mcg/mL    Notes and Comments       Results are as expected.           Controlled Substance Agreement:  Date of the Last Agreement1/25: 1/25  Reviewed Controlled Substance Agreement including but not limited to the benefits, risks, and alternatives to treatment with a Controlled Substance medication(s).    Stimulants:   What is the patient's goal of therapy? Help with concentration  Is this being achieved with current treatment? yes    Activities of Daily Living:   Is your overall impression that this patient is benefiting (symptom reduction outweighs side effects) from stimulant therapy? Yes     1. Physical Functioning: Better  2. Family Relationship: Better  3. Social Relationship: Better  4. Mood: Better  5. Sleep Patterns: Better  6. Overall Function: Better  Subjective   Patient ID: Josue Quintanilla is a 55 y.o. male who presents for Med Refill (3 month med check  ).    HPI     Review of Systems   Constitutional: Negative.  Negative for appetite change and fatigue.   Respiratory:  Negative for chest tightness and shortness of breath.    Neurological:  Negative for tremors and headaches.   Psychiatric/Behavioral:  Positive for decreased concentration. Negative for agitation. The patient is not nervous/anxious and is not hyperactive.         No Insomnia       Objective   /86   Pulse 84   Temp 35.9 °C (96.6 °F)   Resp 16   Ht 1.829 m (6')   Wt 94.3 kg (208 lb)   SpO2 99%   BMI 28.21 kg/m²     Physical Exam  Constitutional:       Appearance: Normal appearance.   HENT:      Head: Normocephalic and atraumatic.   Cardiovascular:      Rate and Rhythm: Normal rate and regular rhythm.   Pulmonary:      Effort: Pulmonary effort is normal.   Skin:     General: Skin is warm and dry.   Neurological:      Mental Status: He is alert and oriented to person, place, and time.   Psychiatric:         Attention and Perception: He is inattentive.         Mood and Affect: Mood normal.         Speech: Speech normal.         Behavior: Behavior normal. Behavior is cooperative.         Cognition and Memory: Cognition normal.       Assessment/Plan   Problem List Items Addressed This Visit           ICD-10-CM    Difficulty concentrating R41.840    Relevant Orders    Drug Screen, Urine With Reflex to Confirmation     Other Visit Diagnoses         Codes      Well adult exam     Z00.00    Relevant Medications    tadalafil 20 mg tablet        Pt to call for Rf at end of july

## 2025-07-02 LAB
AMPHET UR-MCNC: 4514 NG/ML
AMPHETAMINES UR QL: POSITIVE NG/ML
BARBITURATES UR QL: NEGATIVE NG/ML
BENZODIAZ UR QL: NEGATIVE NG/ML
BZE UR QL: NEGATIVE NG/ML
CREAT UR-MCNC: 157.1 MG/DL
DRUG SCREEN COMMENT UR-IMP: ABNORMAL
FENTANYL UR QL SCN: NEGATIVE NG/ML
METHADONE UR QL: NEGATIVE NG/ML
METHAMPHET UR-MCNC: NEGATIVE NG/ML
OPIATES UR QL: NEGATIVE NG/ML
OXIDANTS UR QL: NEGATIVE MCG/ML
OXYCODONE UR QL: NEGATIVE NG/ML
PCP UR QL: NEGATIVE NG/ML
PH UR: 6.1 [PH] (ref 4.5–9)
QUEST NOTES AND COMMENTS: ABNORMAL
THC UR QL: NEGATIVE NG/ML

## 2025-08-04 ENCOUNTER — TELEPHONE (OUTPATIENT)
Dept: PRIMARY CARE | Facility: CLINIC | Age: 55
End: 2025-08-04
Payer: COMMERCIAL

## 2025-08-04 DIAGNOSIS — R41.840 DIFFICULTY CONCENTRATING: ICD-10-CM

## 2025-08-04 RX ORDER — LISDEXAMFETAMINE DIMESYLATE 40 MG/1
40 CAPSULE ORAL DAILY
Qty: 30 CAPSULE | Refills: 0 | Status: SHIPPED | OUTPATIENT
Start: 2025-08-04

## 2025-08-04 NOTE — TELEPHONE ENCOUNTER
Patient requests prescription below    Last Office Visit: 6/30/2025   Next Office Visit: 9/29/2025     Requested Prescriptions     Pending Prescriptions Disp Refills    lisdexamfetamine (Vyvanse) 40 mg capsule 30 capsule 0     Sig: Take 1 capsule (40 mg) by mouth once daily.

## 2025-09-29 ENCOUNTER — APPOINTMENT (OUTPATIENT)
Dept: PRIMARY CARE | Facility: CLINIC | Age: 55
End: 2025-09-29
Payer: COMMERCIAL